# Patient Record
Sex: FEMALE | Race: WHITE | NOT HISPANIC OR LATINO | Employment: OTHER | URBAN - METROPOLITAN AREA
[De-identification: names, ages, dates, MRNs, and addresses within clinical notes are randomized per-mention and may not be internally consistent; named-entity substitution may affect disease eponyms.]

---

## 2017-06-12 ENCOUNTER — OFFICE VISIT (OUTPATIENT)
Dept: INTERNAL MEDICINE | Facility: CLINIC | Age: 47
End: 2017-06-12
Payer: MEDICAID

## 2017-06-12 VITALS
HEART RATE: 106 BPM | HEIGHT: 63 IN | DIASTOLIC BLOOD PRESSURE: 80 MMHG | WEIGHT: 158.31 LBS | RESPIRATION RATE: 18 BRPM | SYSTOLIC BLOOD PRESSURE: 126 MMHG | BODY MASS INDEX: 28.05 KG/M2

## 2017-06-12 DIAGNOSIS — G89.29 CHRONIC BILATERAL LOW BACK PAIN WITH BILATERAL SCIATICA: Primary | ICD-10-CM

## 2017-06-12 DIAGNOSIS — J06.9 UPPER RESPIRATORY TRACT INFECTION, UNSPECIFIED TYPE: ICD-10-CM

## 2017-06-12 DIAGNOSIS — Z72.0 TOBACCO USE: ICD-10-CM

## 2017-06-12 DIAGNOSIS — Z12.39 BREAST CANCER SCREENING: ICD-10-CM

## 2017-06-12 DIAGNOSIS — M54.42 CHRONIC BILATERAL LOW BACK PAIN WITH BILATERAL SCIATICA: Primary | ICD-10-CM

## 2017-06-12 DIAGNOSIS — Z01.419 WELL WOMAN EXAM: ICD-10-CM

## 2017-06-12 DIAGNOSIS — M54.41 CHRONIC BILATERAL LOW BACK PAIN WITH BILATERAL SCIATICA: Primary | ICD-10-CM

## 2017-06-12 DIAGNOSIS — Z76.89 ENCOUNTER TO ESTABLISH CARE: ICD-10-CM

## 2017-06-12 PROBLEM — M54.50 CHRONIC BILATERAL LOW BACK PAIN WITHOUT SCIATICA: Status: ACTIVE | Noted: 2017-06-12

## 2017-06-12 PROCEDURE — 99999 PR PBB SHADOW E&M-EST. PATIENT-LVL V: CPT | Mod: PBBFAC,,, | Performed by: INTERNAL MEDICINE

## 2017-06-12 PROCEDURE — 99215 OFFICE O/P EST HI 40 MIN: CPT | Mod: PBBFAC | Performed by: INTERNAL MEDICINE

## 2017-06-12 PROCEDURE — 99203 OFFICE O/P NEW LOW 30 MIN: CPT | Mod: S$PBB,,, | Performed by: INTERNAL MEDICINE

## 2017-06-12 PROCEDURE — 96372 THER/PROPH/DIAG INJ SC/IM: CPT | Mod: PBBFAC

## 2017-06-12 RX ORDER — METHYLPREDNISOLONE ACETATE 80 MG/ML
80 INJECTION, SUSPENSION INTRA-ARTICULAR; INTRALESIONAL; INTRAMUSCULAR; SOFT TISSUE
Status: COMPLETED | OUTPATIENT
Start: 2017-06-12 | End: 2017-06-12

## 2017-06-12 RX ORDER — BACLOFEN 10 MG/1
10 TABLET ORAL 3 TIMES DAILY
Qty: 90 TABLET | Refills: 11 | Status: SHIPPED | OUTPATIENT
Start: 2017-06-12 | End: 2017-07-17 | Stop reason: DRUGHIGH

## 2017-06-12 RX ORDER — DICLOFENAC SODIUM 50 MG/1
50 TABLET, DELAYED RELEASE ORAL 2 TIMES DAILY
Qty: 60 TABLET | Refills: 0 | Status: SHIPPED | OUTPATIENT
Start: 2017-06-12 | End: 2017-07-06 | Stop reason: SDUPTHER

## 2017-06-12 RX ORDER — IBUPROFEN 200 MG
800 TABLET ORAL EVERY 6 HOURS PRN
COMMUNITY
End: 2017-06-20

## 2017-06-12 RX ORDER — HYDROCODONE BITARTRATE AND ACETAMINOPHEN 5; 325 MG/1; MG/1
1 TABLET ORAL EVERY 12 HOURS PRN
Qty: 60 TABLET | Refills: 0 | Status: SHIPPED | OUTPATIENT
Start: 2017-06-12 | End: 2017-07-17

## 2017-06-12 RX ADMIN — METHYLPREDNISOLONE ACETATE 80 MG: 80 INJECTION, SUSPENSION INTRA-ARTICULAR; INTRALESIONAL; INTRAMUSCULAR; SOFT TISSUE at 03:06

## 2017-06-12 NOTE — PATIENT INSTRUCTIONS
Back Care Tips    Caring for your back  These are things you can do to prevent a recurrence of acute back pain and to reduce symptoms from chronic back pain:  · Maintain a healthy weight. If you are overweight, losing weight will help most types of back pain.  · Exercise is an important part of recovery from most types of back pain. The muscles behind and in front of the spine support the back. This means strengthening both the back muscles and the abdominal muscles will provide better support for your spine.   · Swimming and brisk walking are good overall exercises to improve your fitness level.  · Practice safe lifting methods (below).  · Practice good posture when sitting, standing and walking. Avoid prolonged sitting. This puts more stress on the lower back than standing or walking.  · Wear quality shoes with sufficient arch support. Foot and ankle alignment can affect back symptoms. Women should avoid wearing high heels.  · Therapeutic massage can help relax the back muscles without stretching them.  · During the first 24 to 72 hours after an acute injury or flare-up of chronic back pain, apply an ice pack to the painful area for 20 minutes and then remove it for 20 minutes, over a period of 60 to 90 minutes, or several times a day. As a safety precaution, do not use a heating pad at bedtime. Sleeping on a heating pad can lead to skin burns or tissue damage.  · You can alternate ice and heat therapies.  Medications  Talk to your healthcare provider before using medicines, especially if you have other medical problems or are taking other medicines.  · You may use acetaminophen or ibuprofen to control pain, unless your healthcare provider prescribed other pain medicine. If you have chronic conditions like diabetes, liver or kidney disease, stomach ulcers, or gastrointestinal bleeding, or are taking blood thinners, talk with your healthcare provider before taking any medicines.  · Be careful if you are given  prescription pain medicines, narcotics, or medicine for muscle spasm. They can cause drowsiness, affect your coordination, reflexes, and judgment. Do not drive or operate heavy machinery while taking these types of medicines. Take prescription pain medicine only as prescribed by your healthcare provider.  Lumbar stretch  Here is a simple stretching exercise that will help relax muscle spasm and keep your back more limber. If exercise makes your back pain worse, dont do it.  · Lie on your back with your knees bent and both feet on the ground.  · Slowly raise your left knee to your chest as you flatten your lower back against the floor. Hold for 5 seconds.  · Relax and repeat the exercise with your right knee.  · Do 10 of these exercises for each leg.  Safe lifting method  · Dont bend over at the waist to lift an object off the floor.  Instead, bend your knees and hips in a squat.   · Keep your back and head upright  · Hold the object close to your body, directly in front of you.  · Straighten your legs to lift the object.   · Lower the object to the floor in the reverse fashion.  · If you must slide something across the floor, push it.  Posture tips  Sitting  Sit in chairs with straight backs or low-back support. Keep your knees lower than your hips, with your feet flat on the floor.  When driving, sit up straight. Adjust the seat forward so you are not leaning toward the steering wheel.  A small pillow or rolled towel behind your lower back may help if you are driving long distances.   Standing  When standing for long periods, shift most of your weight to one leg at a time. Alternate legs every few minutes.   Sleeping  The best way to sleep is on your side with your knees bent. Put a low pillow under your head to support your neck in a neutral spine position. Avoid thick pillows that bend your neck to one side. Put a pillow between your legs to further relax your lower back. If you sleep on your back, put pillows  under your knees to support your legs in a slightly flexed position. Use a firm mattress. If your mattress sags, replace it, or use a 1/2-inch plywood board under the mattress to add support.  Follow-up care  Follow up with your healthcare provider, or as advised.  If X-rays, a CT scan or an MRI scan were taken, they will be reviewed by a radiologist. You will be notified of any new findings that may affect your care.  Call 911  Seek emergency medical care if any of the following occur:  · Trouble breathing  · Confusion  · Very drowsy  · Fainting or loss of consciousness  · Rapid or very slow heart rate  · Loss of  bowel or bladder control  When to seek medical care  Call your healthcare provider if any of the following occur:  · Pain becomes worse or spreads to your arms or legs  · Weakness or numbness in one or both arms or legs  · Numbness in the groin area  Date Last Reviewed: 6/1/2016  © 8745-6745 [employer]. [address] [place] [address], [place] [address]. All rights reserved. This information is not intended as a substitute for professional medical care. Always follow your healthcare professional's instructions.

## 2017-06-12 NOTE — PROGRESS NOTES
"Subjective:       Patient ID: Sammi Pope is a 47 y.o. female.    Chief Complaint: Establish Care      HPI:  Patient is new to me and presents to establish care. She reports chronic low back pain. She reports history of bulging disc and "curving spine". She reports chronic pain across the low back. She reports back spasms, worse at night. "I quit breathing due to the pain."  She reports pain for last 15 years. She was living in [place] and following with Dr. Morales Hill and pain specialist was Dr. Taras Lira. She was getting spinal injections and was being prescribed diclofenac and hydrocodone (5-325). She is outo f her medications for about 6 months. She is taking 16 OTC Ibuprofen's daily.  Last injection was > 1 year ago. She does not have her MRI but last was done 3 years ago at [facility] in [place].     She denies any other medical issues. She has not had routine blood work, PCP or MMG since 2013.     Tobacco use: smoking 1 PPD for about 6 years after suicide death of her   EtOH: 3-4 x a year  Illicit drugs: denies    She also reports upper airway congestion and sneezing. Sx started 2-3 days ago. No fevers. Mild cough that is sometimes productive. No SOB or wheezing.     Past Medical History:   Diagnosis Date    Bulging lumbar disc     Migraines        Family History   Problem Relation Age of Onset    Hypertension Mother     Cancer Father 60     lung    Diabetes Sister     Cancer Maternal Aunt      breast       Social History     Social History    Marital status:      Spouse name: N/A    Number of children: N/A    Years of education: N/A     Occupational History    Not on file.     Social History Main Topics    Smoking status: Current Every Day Smoker     Packs/day: 1.00     Types: Cigarettes    Smokeless tobacco: Not on file    Alcohol use Yes      Comment: occ.    Drug use: No    Sexual activity: Yes     Partners: Male     Birth control/ " protection: See Surgical Hx     Other Topics Concern    Not on file     Social History Narrative    No narrative on file       Review of Systems   Constitutional: Negative for activity change, fatigue, fever and unexpected weight change.   HENT: Negative for congestion, ear pain, hearing loss, rhinorrhea and sore throat.    Eyes: Negative for pain, redness and visual disturbance.   Respiratory: Negative for cough, shortness of breath and wheezing.    Cardiovascular: Negative for chest pain, palpitations and leg swelling.   Gastrointestinal: Negative for abdominal pain, constipation, diarrhea, nausea and vomiting.   Genitourinary: Negative for dysuria, frequency and urgency.   Musculoskeletal: Positive for back pain. Negative for joint swelling and neck pain.   Skin: Negative for color change, rash and wound.   Neurological: Negative for dizziness, tremors, weakness, light-headedness and headaches.         Objective:      Physical Exam   Constitutional: She is oriented to person, place, and time. She appears well-developed and well-nourished. No distress.   HENT:   Head: Normocephalic and atraumatic.   Right Ear: External ear normal.   Left Ear: External ear normal.   Mouth/Throat: Oropharynx is clear and moist.   Dull TM b/l  Normal posterior orpharynx   Eyes: Conjunctivae and EOM are normal. Pupils are equal, round, and reactive to light.   Neck: Neck supple. No tracheal deviation present.   Cardiovascular: Normal rate and regular rhythm.  Exam reveals no gallop and no friction rub.    No murmur heard.  Pulmonary/Chest: Effort normal. No respiratory distress. She has wheezes (b/l bases). She has no rales.   Abdominal: Soft. Bowel sounds are normal. She exhibits no distension. There is no tenderness.   Musculoskeletal: She exhibits tenderness (b/l lumbar paraspinal). She exhibits no deformity.   Neurological: She is alert and oriented to person, place, and time. No cranial nerve deficit.   Skin: Skin is warm and  dry.   Psychiatric: She has a normal mood and affect. Her behavior is normal.   Vitals reviewed.      Assessment:       1. Chronic bilateral low back pain with bilateral sciatica    2. Tobacco use    3. Encounter to establish care    4. Breast cancer screening    5. Well woman exam    6. Upper respiratory tract infection, unspecified type        Plan:       Sammi was seen today for establish care.    Diagnoses and all orders for this visit:    Chronic bilateral low back pain with bilateral sciatica  -     diclofenac (VOLTAREN) 50 MG EC tablet; Take 1 tablet (50 mg total) by mouth 2 (two) times daily.  -     MRI Lumbar Spine Without Contrast; Future  -     baclofen (LIORESAL) 10 MG tablet; Take 1 tablet (10 mg total) by mouth 3 (three) times daily.  -     hydrocodone-acetaminophen 5-325mg (NORCO) 5-325 mg per tablet; Take 1 tablet by mouth every 12 (twelve) hours as needed for Pain.  -     methylPREDNISolone acetate injection 80 mg; Inject 1 mL (80 mg total) into the muscle one time.  Will get prior records to review  Start diclofenac and baclofen  1 month supply Norco given. Discussed pain contract: agrees to random UDS, no other providers can prescribe, only 1 pharmacy can fill. Also discussed I reserve the right to stop filling at any time. She agrees and voiced understanding  Needs MRI so she can establish with a pain management physician    Tobacco use  Not ready to quit  Discussed medictions  Smoking due to pain right now    Encounter to establish care  -     CBC auto differential; Future  -     Comprehensive metabolic panel; Future  -     TSH; Future  -     Lipid panel; Future  -     T4, free; Future    Breast cancer screening  -     Mammo Digital Diagnostic Bilat with Tomosynthesis with CAD; Future    Well woman exam  -     Ambulatory Referral to Obstetrics / Gynecology    Upper respiratory tract infection, unspecified type  -     methylPREDNISolone acetate injection 80 mg; Inject 1 mL (80 mg total) into the  muscle one time.  Start claritin OTC    Health Maintenance:  -CRC: not due  -PAP/MMG: MMG ordered  -Bone Density: not due  -tobacco: current smoker  -Vaccines  Flu- out of season    RTC 3 months and PRN. Will call with labs

## 2017-06-20 ENCOUNTER — OFFICE VISIT (OUTPATIENT)
Dept: OBSTETRICS AND GYNECOLOGY | Facility: CLINIC | Age: 47
End: 2017-06-20
Payer: MEDICAID

## 2017-06-20 ENCOUNTER — HOSPITAL ENCOUNTER (OUTPATIENT)
Dept: RADIOLOGY | Facility: HOSPITAL | Age: 47
Discharge: HOME | End: 2017-06-20
Attending: INTERNAL MEDICINE
Payer: MEDICAID

## 2017-06-20 VITALS
WEIGHT: 160.19 LBS | HEART RATE: 82 BPM | BODY MASS INDEX: 28.38 KG/M2 | HEIGHT: 63 IN | SYSTOLIC BLOOD PRESSURE: 126 MMHG | DIASTOLIC BLOOD PRESSURE: 74 MMHG | RESPIRATION RATE: 13 BRPM

## 2017-06-20 VITALS — BODY MASS INDEX: 30.12 KG/M2 | WEIGHT: 170 LBS | HEIGHT: 63 IN

## 2017-06-20 DIAGNOSIS — M54.42 CHRONIC BILATERAL LOW BACK PAIN WITH BILATERAL SCIATICA: ICD-10-CM

## 2017-06-20 DIAGNOSIS — D50.9 MICROCYTIC ANEMIA: Primary | ICD-10-CM

## 2017-06-20 DIAGNOSIS — Z01.419 WELL WOMAN EXAM WITH ROUTINE GYNECOLOGICAL EXAM: Primary | ICD-10-CM

## 2017-06-20 DIAGNOSIS — G89.29 CHRONIC BILATERAL LOW BACK PAIN WITH BILATERAL SCIATICA: ICD-10-CM

## 2017-06-20 DIAGNOSIS — Z12.39 BREAST CANCER SCREENING: ICD-10-CM

## 2017-06-20 DIAGNOSIS — Z90.710 HISTORY OF HYSTERECTOMY: ICD-10-CM

## 2017-06-20 DIAGNOSIS — M54.41 CHRONIC BILATERAL LOW BACK PAIN WITH BILATERAL SCIATICA: ICD-10-CM

## 2017-06-20 LAB
FERRITIN SERPL-MCNC: 140 NG/ML
IRON SERPL-MCNC: 46 UG/DL
RETICS/RBC NFR AUTO: 1.4 %
SATURATED IRON: 13 %
TOTAL IRON BINDING CAPACITY: 346 UG/DL
TRANSFERRIN SERPL-MCNC: 234 MG/DL

## 2017-06-20 PROCEDURE — 99999 PR PBB SHADOW E&M-EST. PATIENT-LVL III: CPT | Mod: PBBFAC,,, | Performed by: OBSTETRICS & GYNECOLOGY

## 2017-06-20 PROCEDURE — 99213 OFFICE O/P EST LOW 20 MIN: CPT | Mod: PBBFAC,25 | Performed by: OBSTETRICS & GYNECOLOGY

## 2017-06-20 PROCEDURE — 77067 SCR MAMMO BI INCL CAD: CPT | Mod: 26,,, | Performed by: RADIOLOGY

## 2017-06-20 PROCEDURE — 72148 MRI LUMBAR SPINE W/O DYE: CPT | Mod: 26,,, | Performed by: RADIOLOGY

## 2017-06-20 PROCEDURE — 99386 PREV VISIT NEW AGE 40-64: CPT | Mod: S$PBB,,, | Performed by: OBSTETRICS & GYNECOLOGY

## 2017-06-20 PROCEDURE — 77063 BREAST TOMOSYNTHESIS BI: CPT | Mod: 26,,, | Performed by: RADIOLOGY

## 2017-06-20 NOTE — PROGRESS NOTES
Subjective:    Patient ID: Sammi Pope is a 47 y.o. y.o. female.     Chief Complaint: Annual Well Woman Exam     History of Present Illness:  Sammi presents today for Annual Well Woman exam. She describes her menses as absent s/p hysterectomy.She denies pelvic pain.  She denies breast tenderness, masses, nipple discharge. She denies difficulty with urination, but does report constipation with bowel movements. She denies menopausal symptoms such as hotflashes, vaginal dryness, and night sweats. She denies bloating, early satiety, or weight changes. She is sexually active. Contraception is by hysterectomy.      Menstrual History:   No LMP recorded. Patient has had a hysterectomy..     OB History      Para Term  AB Living    4 3 3  1     SAB TAB Ectopic Multiple Live Births    1              The following portions of the patient's history were reviewed and updated as appropriate: allergies, current medications, past family history, past medical history, past social history, past surgical history and problem list.    ROS:   CONSTITUTIONAL: weakness, weight gain, Denies: fever, chills, diaphoresis, fatigue, weight loss  ENT: nasal congestion, Denies: sore throat, nasal discharge, epistaxis, tinnitus, hearing loss  EYES: negative for blurry vision, decreased vision, loss of vision, eye pain, diplopia, photophobia, discharge  SKIN: Negative for rash, itching, hives  RESPIRATORY: cough, wheezing, Denies: hemoptysis, shortness of breath, pleuritic chest pain  CARDIOVASCULAR: negative for chest pain, dyspnea on exertion, orthopnea, paroxysmal nocturnal dyspnea, edema, palpitations  BREAST: negative for breast  tenderness, breast mass, nipple discharge, or skin changes  GASTROINTESTINAL: abdominal pain, constipation, Denies: flank pain, nausea, vomiting, diarrhea, black stool, blood in stool  GENITOURINARY: negative for abnormal vaginal bleeding, amenorrhea, decreased libido, dysuria, genital sores,  hematuria, incontinence, menorrhagia, pelvic pain, urinary frequency, vaginal discharge  HEMATOLOGIC/LYMPHATIC: negative for swollen lymph nodes, bleeding, bruising  MUSCULOSKELETAL: back pain, Denies: joint pain, joint stiffness, joint swelling, muscle pain, muscle weakness  NEUROLOGICAL: headache, Denies: dizzy/vertigo, focal weakness, numbness/tingling, speech problems, loss of consciousness, confusion, memory loss  BEHAVORIAL/PSYCH: negative for anxiety, depression, psychosis  ENDOCRINE: negative for polydipsia/polyuria, palpitations, skin changes, temperature intolerance, unexpected weight changes  ALLERGIC/IMMUNOLOGIC: negative for urticaria, hay fever, angioedema      Objective:    Vital Signs:  Vitals:    06/20/17 1224   BP: 126/74   Pulse: 82   Resp: 13       Physical Exam:  General:  alert, cooperative, appears stated age, no distress   Skin:  Skin color, texture, turgor normal. No rashes or lesions   HEENT:  extra ocular movement intact, sclera clear, anicteric   Neck: supple, trachea midline, no adenopathy or thyromegally   Respiratory:  Normal effort   Breasts:  no discharge, erythema, or tenderness, no skin dimpling or peau d'orange   Abdomen:  soft, nontender, no palpable masses   Pelvis: External genitalia: normal general appearance  Urinary system: urethral meatus normal, bladder nontender  Vaginal: normal mucosa without prolapse or lesions  Cervix: removed surgically  Uterus: removed surgically  Adnexa: normal size, nontender bilaterally   Extremities: Normal ROM; no edema, no cyanosis   Neurologial: Normal strength and tone. No focal numbness or weakness   Psychiatric: normal mood, speech, dress, and thought processes         Assessment:       Healthy female exam.     1. Well woman exam with routine gynecological exam    2. History of hysterectomy          Plan:      1. Well woman exam with routine gynecological exam  Healthy exam  Pap no longer indicated per pt history    2. History of  hysterectomy      COUNSELING:  Sammi was counseled on STD pevention, use and side-effects of various contraceptive measures, A.C.O.G. Pap guidelines and recommendations for yearly pelvic exams in addition to recommendations for monthly self breast exams; to see her PCP for other health maintenance.

## 2017-06-22 ENCOUNTER — TELEPHONE (OUTPATIENT)
Dept: INTERNAL MEDICINE | Facility: CLINIC | Age: 47
End: 2017-06-22

## 2017-06-22 DIAGNOSIS — M54.50 CHRONIC BILATERAL LOW BACK PAIN WITHOUT SCIATICA: Primary | ICD-10-CM

## 2017-06-22 DIAGNOSIS — G89.29 CHRONIC BILATERAL LOW BACK PAIN WITHOUT SCIATICA: Primary | ICD-10-CM

## 2017-06-23 DIAGNOSIS — R92.8 ABNORMAL MAMMOGRAM OF BOTH BREASTS: Primary | ICD-10-CM

## 2017-06-26 ENCOUNTER — TELEPHONE (OUTPATIENT)
Dept: RADIOLOGY | Facility: HOSPITAL | Age: 47
End: 2017-06-26

## 2017-06-26 NOTE — PROGRESS NOTES
Informed pt of results. Expressed understanding. Scheduled by [facility] at [facility].

## 2017-07-06 ENCOUNTER — HOSPITAL ENCOUNTER (OUTPATIENT)
Dept: RADIOLOGY | Facility: HOSPITAL | Age: 47
Discharge: HOME | End: 2017-07-06
Attending: INTERNAL MEDICINE
Payer: MEDICAID

## 2017-07-06 VITALS — WEIGHT: 160 LBS | BODY MASS INDEX: 28.35 KG/M2 | HEIGHT: 63 IN

## 2017-07-06 DIAGNOSIS — R92.8 ABNORMAL MAMMOGRAM OF BOTH BREASTS: ICD-10-CM

## 2017-07-06 DIAGNOSIS — G89.29 CHRONIC BILATERAL LOW BACK PAIN WITH BILATERAL SCIATICA: ICD-10-CM

## 2017-07-06 DIAGNOSIS — M54.42 CHRONIC BILATERAL LOW BACK PAIN WITH BILATERAL SCIATICA: ICD-10-CM

## 2017-07-06 DIAGNOSIS — M54.41 CHRONIC BILATERAL LOW BACK PAIN WITH BILATERAL SCIATICA: ICD-10-CM

## 2017-07-06 PROCEDURE — 77066 DX MAMMO INCL CAD BI: CPT | Mod: 26,,, | Performed by: RADIOLOGY

## 2017-07-06 PROCEDURE — 77062 BREAST TOMOSYNTHESIS BI: CPT | Mod: 26,,, | Performed by: RADIOLOGY

## 2017-07-06 PROCEDURE — 76641 ULTRASOUND BREAST COMPLETE: CPT | Mod: 26,50,, | Performed by: RADIOLOGY

## 2017-07-06 PROCEDURE — 76641 ULTRASOUND BREAST COMPLETE: CPT | Mod: TC,50

## 2017-07-06 PROCEDURE — 77066 DX MAMMO INCL CAD BI: CPT | Mod: TC

## 2017-07-06 NOTE — TELEPHONE ENCOUNTER
Requested Prescriptions     Pending Prescriptions Disp Refills    diclofenac (VOLTAREN) 50 MG EC tablet 60 tablet 0     Sig: Take 1 tablet (50 mg total) by mouth 2 (two) times daily.    Susannah

## 2017-07-10 RX ORDER — DICLOFENAC SODIUM 50 MG/1
50 TABLET, DELAYED RELEASE ORAL 2 TIMES DAILY
Qty: 60 TABLET | Refills: 0 | Status: SHIPPED | OUTPATIENT
Start: 2017-07-10 | End: 2017-08-04 | Stop reason: SDUPTHER

## 2017-07-17 ENCOUNTER — OFFICE VISIT (OUTPATIENT)
Dept: INTERNAL MEDICINE | Facility: CLINIC | Age: 47
End: 2017-07-17
Payer: MEDICAID

## 2017-07-17 VITALS
WEIGHT: 157.19 LBS | BODY MASS INDEX: 27.85 KG/M2 | RESPIRATION RATE: 18 BRPM | DIASTOLIC BLOOD PRESSURE: 80 MMHG | HEIGHT: 63 IN | SYSTOLIC BLOOD PRESSURE: 118 MMHG | HEART RATE: 87 BPM

## 2017-07-17 DIAGNOSIS — M54.50 CHRONIC BILATERAL LOW BACK PAIN WITHOUT SCIATICA: Primary | ICD-10-CM

## 2017-07-17 DIAGNOSIS — G89.29 CHRONIC BILATERAL LOW BACK PAIN WITHOUT SCIATICA: Primary | ICD-10-CM

## 2017-07-17 DIAGNOSIS — G47.00 INSOMNIA, UNSPECIFIED TYPE: ICD-10-CM

## 2017-07-17 DIAGNOSIS — Z72.0 TOBACCO USE: ICD-10-CM

## 2017-07-17 PROCEDURE — 99213 OFFICE O/P EST LOW 20 MIN: CPT | Mod: PBBFAC | Performed by: INTERNAL MEDICINE

## 2017-07-17 PROCEDURE — 99213 OFFICE O/P EST LOW 20 MIN: CPT | Mod: S$PBB,,, | Performed by: INTERNAL MEDICINE

## 2017-07-17 PROCEDURE — 99999 PR PBB SHADOW E&M-EST. PATIENT-LVL III: CPT | Mod: PBBFAC,,, | Performed by: INTERNAL MEDICINE

## 2017-07-17 RX ORDER — BACLOFEN 20 MG/1
20 TABLET ORAL 3 TIMES DAILY
Qty: 90 TABLET | Refills: 11 | Status: SHIPPED | OUTPATIENT
Start: 2017-07-17 | End: 2017-09-26 | Stop reason: SDUPTHER

## 2017-07-17 RX ORDER — AMITRIPTYLINE HYDROCHLORIDE 25 MG/1
25 TABLET, FILM COATED ORAL NIGHTLY PRN
Qty: 30 TABLET | Refills: 1 | Status: SHIPPED | OUTPATIENT
Start: 2017-07-17 | End: 2017-09-26 | Stop reason: SDUPTHER

## 2017-07-17 NOTE — PROGRESS NOTES
"Subjective:       Patient ID: Sammi Pope is a 47 y.o. female.    Chief Complaint: Back Pain      HPI:  Patient is known to me and presents for follow up back pain. She was continued on her prior dose Norco 5/325mg BID PRN at last visit. Tells me her insurance co will only pay for 45 tabs. She is still having pain. + muscle spasms that are waking her up at night. She is using the baclofen 10mg witout relief. She is on diclofenac as well for anti-inflammatory. Her  is at the appointment and wants her Norco dose increased. She is crying. She also reports she can't sleep. Can fall asleep but can't stay asleep. Tells me she used to be on Elavil and would like a prescription.         From last visit for historical purposes: She reports chronic low back pain. She reports history of bulging disc and "curving spine". She reports chronic pain across the low back. She reports back spasms, worse at night. "I quit breathing due to the pain."  She reports pain for last 15 years. She was living in [place] and following with Dr. Morales Hill and pain specialist was Dr. Taras Lira. She was getting spinal injections and was being prescribed diclofenac and hydrocodone (5-325). She is outo f her medications for about 6 months. She is taking 16 OTC Ibuprofen's daily.  Last injection was > 1 year ago. She does not have her MRI but last was done 3 years ago at [facility] in [place].        Tobacco use: smoking 1 PPD for about 6 years after suicide death of her   EtOH: 3-4 x a year  Illicit drugs: denies     Sh  Past Medical History:   Diagnosis Date    Bulging lumbar disc     Migraines        Family History   Problem Relation Age of Onset    Hypertension Mother     Cancer Father 60     lung    Diabetes Sister     Cancer Maternal Aunt      breast    Breast cancer Maternal Aunt     Colon cancer Neg Hx     Ovarian cancer Neg Hx        Social History     Social History    " Marital status:      Spouse name: N/A    Number of children: N/A    Years of education: N/A     Occupational History    Not on file.     Social History Main Topics    Smoking status: Current Every Day Smoker     Packs/day: 1.00     Years: 6.00     Types: Cigarettes    Smokeless tobacco: Never Used    Alcohol use Yes      Comment: occ.    Drug use: No    Sexual activity: Yes     Partners: Male     Birth control/ protection: See Surgical Hx      Comment:      Other Topics Concern    Not on file     Social History Narrative    No narrative on file       Review of Systems   Constitutional: Negative for activity change, fatigue, fever and unexpected weight change.   HENT: Negative for congestion, ear pain, hearing loss, rhinorrhea and sore throat.    Eyes: Negative for pain, redness and visual disturbance.   Respiratory: Negative for cough, shortness of breath and wheezing.    Cardiovascular: Negative for chest pain, palpitations and leg swelling.   Gastrointestinal: Negative for abdominal pain, constipation, diarrhea, nausea and vomiting.   Genitourinary: Negative for dysuria, frequency, pelvic pain and urgency.   Musculoskeletal: Positive for back pain and myalgias (muscle spasms). Negative for joint swelling and neck pain.   Skin: Negative for color change, rash and wound.   Neurological: Negative for dizziness, tremors, weakness, light-headedness and headaches.         Objective:      Physical Exam   Constitutional: She is oriented to person, place, and time. She appears well-developed and well-nourished. No distress.   HENT:   Head: Normocephalic and atraumatic.   Right Ear: External ear normal.   Left Ear: External ear normal.   Eyes: Conjunctivae and EOM are normal. Pupils are equal, round, and reactive to light.   Neck: Neck supple. No tracheal deviation present.   Cardiovascular: Normal rate and regular rhythm.  Exam reveals no gallop and no friction rub.    No murmur  heard.  Pulmonary/Chest: Effort normal and breath sounds normal. No respiratory distress. She has no wheezes. She has no rales.   Abdominal: Soft. Bowel sounds are normal. She exhibits no distension. There is no tenderness.   Musculoskeletal: She exhibits tenderness (b/l low back paraspinal muscles). She exhibits no edema or deformity.   Neurological: She is alert and oriented to person, place, and time. No cranial nerve deficit.   Skin: Skin is warm and dry.   Psychiatric: She has a normal mood and affect. Her behavior is normal.   Vitals reviewed.      Assessment:       1. Chronic bilateral low back pain without sciatica    2. Tobacco use    3. Insomnia, unspecified type        Plan:       Sammi was seen today for back pain.    Diagnoses and all orders for this visit:    Chronic bilateral low back pain without sciatica  -     Ambulatory consult to Pain Clinic  -     baclofen (LIORESAL) 20 MG tablet; Take 1 tablet (20 mg total) by mouth 3 (three) times daily.  Increase baclofen to 20mg TID  Will continue on Norco 5mg BID for now. If her insurance iwll only cover 45 tabs that is what she will get per month. Use as needed.  Cont diclofenac  Can consider gabapentin, cymbalta, etc for chronic pain management  Referred to pain management  Discussed her her and her  today: I am not a pain management physician and I can not assume care for her chronic narcotic use. I will continue on the same dose but can not increase the dose    Tobacco use  Needs to quit. Not ready    Insomnia, unspecified type  -     amitriptyline (ELAVIL) 25 MG tablet; Take 1 tablet (25 mg total) by mouth nightly as needed for Insomnia.  New problem  Likely pain playing a role here but will trial elavil at night as well       Health Maintenance:  -CRC: not due  -PAP/MMG: MMG ordered  -Bone Density: not due  -tobacco: current smoker  -Vaccines  Flu- out of season    RTC 6 months and PRN.

## 2017-07-17 NOTE — PATIENT INSTRUCTIONS
Back Care Tips    Caring for your back  These are things you can do to prevent a recurrence of acute back pain and to reduce symptoms from chronic back pain:  · Maintain a healthy weight. If you are overweight, losing weight will help most types of back pain.  · Exercise is an important part of recovery from most types of back pain. The muscles behind and in front of the spine support the back. This means strengthening both the back muscles and the abdominal muscles will provide better support for your spine.   · Swimming and brisk walking are good overall exercises to improve your fitness level.  · Practice safe lifting methods (below).  · Practice good posture when sitting, standing and walking. Avoid prolonged sitting. This puts more stress on the lower back than standing or walking.  · Wear quality shoes with sufficient arch support. Foot and ankle alignment can affect back symptoms. Women should avoid wearing high heels.  · Therapeutic massage can help relax the back muscles without stretching them.  · During the first 24 to 72 hours after an acute injury or flare-up of chronic back pain, apply an ice pack to the painful area for 20 minutes and then remove it for 20 minutes, over a period of 60 to 90 minutes, or several times a day. As a safety precaution, do not use a heating pad at bedtime. Sleeping on a heating pad can lead to skin burns or tissue damage.  · You can alternate ice and heat therapies.  Medications  Talk to your healthcare provider before using medicines, especially if you have other medical problems or are taking other medicines.  · You may use acetaminophen or ibuprofen to control pain, unless your healthcare provider prescribed other pain medicine. If you have chronic conditions like diabetes, liver or kidney disease, stomach ulcers, or gastrointestinal bleeding, or are taking blood thinners, talk with your healthcare provider before taking any medicines.  · Be careful if you are given  prescription pain medicines, narcotics, or medicine for muscle spasm. They can cause drowsiness, affect your coordination, reflexes, and judgment. Do not drive or operate heavy machinery while taking these types of medicines. Take prescription pain medicine only as prescribed by your healthcare provider.  Lumbar stretch  Here is a simple stretching exercise that will help relax muscle spasm and keep your back more limber. If exercise makes your back pain worse, dont do it.  · Lie on your back with your knees bent and both feet on the ground.  · Slowly raise your left knee to your chest as you flatten your lower back against the floor. Hold for 5 seconds.  · Relax and repeat the exercise with your right knee.  · Do 10 of these exercises for each leg.  Safe lifting method  · Dont bend over at the waist to lift an object off the floor.  Instead, bend your knees and hips in a squat.   · Keep your back and head upright  · Hold the object close to your body, directly in front of you.  · Straighten your legs to lift the object.   · Lower the object to the floor in the reverse fashion.  · If you must slide something across the floor, push it.  Posture tips  Sitting  Sit in chairs with straight backs or low-back support. Keep your knees lower than your hips, with your feet flat on the floor.  When driving, sit up straight. Adjust the seat forward so you are not leaning toward the steering wheel.  A small pillow or rolled towel behind your lower back may help if you are driving long distances.   Standing  When standing for long periods, shift most of your weight to one leg at a time. Alternate legs every few minutes.   Sleeping  The best way to sleep is on your side with your knees bent. Put a low pillow under your head to support your neck in a neutral spine position. Avoid thick pillows that bend your neck to one side. Put a pillow between your legs to further relax your lower back. If you sleep on your back, put pillows  under your knees to support your legs in a slightly flexed position. Use a firm mattress. If your mattress sags, replace it, or use a 1/2-inch plywood board under the mattress to add support.  Follow-up care  Follow up with your healthcare provider, or as advised.  If X-rays, a CT scan or an MRI scan were taken, they will be reviewed by a radiologist. You will be notified of any new findings that may affect your care.  Call 911  Seek emergency medical care if any of the following occur:  · Trouble breathing  · Confusion  · Very drowsy  · Fainting or loss of consciousness  · Rapid or very slow heart rate  · Loss of  bowel or bladder control  When to seek medical care  Call your healthcare provider if any of the following occur:  · Pain becomes worse or spreads to your arms or legs  · Weakness or numbness in one or both arms or legs  · Numbness in the groin area  Date Last Reviewed: 6/1/2016  © 3084-1066 [employer] [address] [place] [address], [place] [address]. All rights reserved. This information is not intended as a substitute for professional medical care. Always follow your healthcare professional's instructions.

## 2017-07-28 ENCOUNTER — TELEPHONE (OUTPATIENT)
Dept: INTERNAL MEDICINE | Facility: CLINIC | Age: 47
End: 2017-07-28

## 2017-07-28 RX ORDER — HYDROCODONE BITARTRATE AND ACETAMINOPHEN 5; 325 MG/1; MG/1
1 TABLET ORAL EVERY 12 HOURS PRN
Qty: 60 TABLET | Refills: 0 | Status: SHIPPED | OUTPATIENT
Start: 2017-07-28 | End: 2017-08-28 | Stop reason: SDUPTHER

## 2017-07-28 NOTE — TELEPHONE ENCOUNTER
I notified patient that you have reviewed her records and we discussed your recommendations. She is requesting a refill on the Norco. Please advise. Thanks.

## 2017-07-28 NOTE — TELEPHONE ENCOUNTER
Pt calling to check status of medical record request. Pt wants to know if records were received to Dr. Hill's office. Please advise.

## 2017-07-28 NOTE — TELEPHONE ENCOUNTER
I did receive them. I see that she was prescribed Norco 5mg BID and that is what I have continued her on. I also see that she did not tolerate gabapentin in the past. I don't see if she has ever been on lyrica so we could consider this medication in the future. She has done physical therapy with her prior MD and was also getting injections. My plan for her remains the same. Can cont Norco 5mg BID PRN. Can consider lyrical or cymbalta int he future for pain management. Cont baclofen. Needs to try and find pain management MD for continued injections.    Thanks

## 2017-08-04 DIAGNOSIS — M54.41 CHRONIC BILATERAL LOW BACK PAIN WITH BILATERAL SCIATICA: ICD-10-CM

## 2017-08-04 DIAGNOSIS — G89.29 CHRONIC BILATERAL LOW BACK PAIN WITH BILATERAL SCIATICA: ICD-10-CM

## 2017-08-04 DIAGNOSIS — M54.42 CHRONIC BILATERAL LOW BACK PAIN WITH BILATERAL SCIATICA: ICD-10-CM

## 2017-08-04 RX ORDER — DICLOFENAC SODIUM 50 MG/1
TABLET, DELAYED RELEASE ORAL
Qty: 60 TABLET | Refills: 0 | Status: SHIPPED | OUTPATIENT
Start: 2017-08-04 | End: 2017-09-26 | Stop reason: SDUPTHER

## 2017-08-11 ENCOUNTER — TELEPHONE (OUTPATIENT)
Dept: INTERNAL MEDICINE | Facility: CLINIC | Age: 47
End: 2017-08-11

## 2017-08-11 NOTE — TELEPHONE ENCOUNTER
She should call her insurance company and see what pain doctors are covered under her plan. She can then call those doctors for appointments. Thanks

## 2017-08-11 NOTE — TELEPHONE ENCOUNTER
----- Message from Marian Omer sent at 2017 12:19 PM CDT -----  Contact: Pt  Sammi Pope  MRN: [id number]  : 1970  PCP: Imelda Whitt  Home Phone      [phone]  Work Phone      Not on file.  Mobile          [phone]      MESSAGE: Sammi called to see if we had a list of pain specialists in the area that take Medicaid. No one she tried calling takes it. Please advise.    Phone [phone]

## 2017-08-11 NOTE — TELEPHONE ENCOUNTER
Patient stated [facility] in [place] isn't accepting any new Medicaid patients that lives outside of the [place] area at the moment.

## 2017-08-15 ENCOUNTER — TELEPHONE (OUTPATIENT)
Dept: INTERNAL MEDICINE | Facility: CLINIC | Age: 47
End: 2017-08-15

## 2017-08-15 NOTE — TELEPHONE ENCOUNTER
I contacted the patient to obtain more info on this provider. She states that she actually will not be able to see him as he is leaving and new doctor not coming in until October. She has been unable to find a pain management doctor who accepts medicaid. She is asking if you will continue to prescribe her pain medication until she can get an appt with this doctor in October. Please advise. Thanks.

## 2017-08-15 NOTE — TELEPHONE ENCOUNTER
----- Message from Demi Arana sent at 8/15/2017 10:36 AM CDT -----  Contact: SELF  Sammi Pope  MRN: [id number]  : 1970  PCP: Imelda Whitt  Home Phone      [phone]  Work Phone      Not on file.  Mobile          [phone]      MESSAGE: NEED AREFERRAL SENT TO  DR BRITT SALCIDO  FAX  [phone]      [phone]

## 2017-08-28 RX ORDER — HYDROCODONE BITARTRATE AND ACETAMINOPHEN 5; 325 MG/1; MG/1
1 TABLET ORAL EVERY 12 HOURS PRN
Qty: 60 TABLET | Refills: 0 | Status: SHIPPED | OUTPATIENT
Start: 2017-08-28 | End: 2017-09-26 | Stop reason: SDUPTHER

## 2017-08-28 NOTE — TELEPHONE ENCOUNTER
----- Message from Demi Arana sent at 2017 10:35 AM CDT -----  Contact: self  Sammi Pope  MRN: [id number]  : 1970  PCP: Imelda Whitt  Home Phone      [phone]  Work Phone      Not on file.  Mobile          [phone]      MESSAGE: makayla colunga-----[phone]

## 2017-09-08 DIAGNOSIS — G89.29 CHRONIC BILATERAL LOW BACK PAIN WITH BILATERAL SCIATICA: ICD-10-CM

## 2017-09-08 DIAGNOSIS — M54.42 CHRONIC BILATERAL LOW BACK PAIN WITH BILATERAL SCIATICA: ICD-10-CM

## 2017-09-08 DIAGNOSIS — M54.41 CHRONIC BILATERAL LOW BACK PAIN WITH BILATERAL SCIATICA: ICD-10-CM

## 2017-09-08 RX ORDER — DICLOFENAC SODIUM 50 MG/1
TABLET, DELAYED RELEASE ORAL
Qty: 60 TABLET | Refills: 0 | OUTPATIENT
Start: 2017-09-08

## 2017-09-08 NOTE — TELEPHONE ENCOUNTER
Requested Prescriptions     Pending Prescriptions Disp Refills    diclofenac (VOLTAREN) 50 MG EC tablet 60 tablet 0

## 2017-09-26 DIAGNOSIS — M54.42 CHRONIC BILATERAL LOW BACK PAIN WITH BILATERAL SCIATICA: ICD-10-CM

## 2017-09-26 DIAGNOSIS — G89.29 CHRONIC BILATERAL LOW BACK PAIN WITHOUT SCIATICA: ICD-10-CM

## 2017-09-26 DIAGNOSIS — M54.50 CHRONIC BILATERAL LOW BACK PAIN WITHOUT SCIATICA: ICD-10-CM

## 2017-09-26 DIAGNOSIS — G89.29 CHRONIC BILATERAL LOW BACK PAIN WITH BILATERAL SCIATICA: ICD-10-CM

## 2017-09-26 DIAGNOSIS — G47.00 INSOMNIA, UNSPECIFIED TYPE: ICD-10-CM

## 2017-09-26 DIAGNOSIS — M54.41 CHRONIC BILATERAL LOW BACK PAIN WITH BILATERAL SCIATICA: ICD-10-CM

## 2017-09-26 RX ORDER — DICLOFENAC SODIUM 50 MG/1
TABLET, DELAYED RELEASE ORAL
Qty: 60 TABLET | Refills: 0 | Status: SHIPPED | OUTPATIENT
Start: 2017-09-26 | End: 2017-10-27 | Stop reason: SDUPTHER

## 2017-09-26 RX ORDER — BACLOFEN 20 MG/1
20 TABLET ORAL 3 TIMES DAILY
Qty: 90 TABLET | Refills: 11 | Status: SHIPPED | OUTPATIENT
Start: 2017-09-26 | End: 2018-08-08 | Stop reason: SDUPTHER

## 2017-09-26 RX ORDER — HYDROCODONE BITARTRATE AND ACETAMINOPHEN 5; 325 MG/1; MG/1
1 TABLET ORAL EVERY 12 HOURS PRN
Qty: 60 TABLET | Refills: 0 | Status: SHIPPED | OUTPATIENT
Start: 2017-09-26 | End: 2017-11-02 | Stop reason: SDUPTHER

## 2017-09-26 RX ORDER — AMITRIPTYLINE HYDROCHLORIDE 25 MG/1
25 TABLET, FILM COATED ORAL NIGHTLY PRN
Qty: 30 TABLET | Refills: 1 | Status: SHIPPED | OUTPATIENT
Start: 2017-09-26 | End: 2017-11-21 | Stop reason: SDUPTHER

## 2017-09-26 NOTE — TELEPHONE ENCOUNTER
For continuity purposes:  Filling norco until she sees pain management in October per our last conversation.

## 2017-10-19 ENCOUNTER — TELEPHONE (OUTPATIENT)
Dept: INTERNAL MEDICINE | Facility: CLINIC | Age: 47
End: 2017-10-19

## 2017-10-19 DIAGNOSIS — G89.29 CHRONIC BILATERAL LOW BACK PAIN WITHOUT SCIATICA: Primary | ICD-10-CM

## 2017-10-19 DIAGNOSIS — M54.50 CHRONIC BILATERAL LOW BACK PAIN WITHOUT SCIATICA: Primary | ICD-10-CM

## 2017-10-19 NOTE — TELEPHONE ENCOUNTER
----- Message from Marian Omer sent at 10/19/2017  9:37 AM CDT -----  Contact: Pt  Sammi Pope  MRN: [id number]  : 1970  PCP: Imelda Whitt  Home Phone      [phone]  Work Phone      Not on file.  Mobile          [phone]      MESSAGE:  Called to check on her referral to [facility] in [place]. Says she needs the referral and clinic notes faxed to [phone].    PHONE:  [phone]

## 2017-10-27 ENCOUNTER — TELEPHONE (OUTPATIENT)
Dept: INTERNAL MEDICINE | Facility: CLINIC | Age: 47
End: 2017-10-27

## 2017-10-27 DIAGNOSIS — G89.29 CHRONIC BILATERAL LOW BACK PAIN WITH BILATERAL SCIATICA: ICD-10-CM

## 2017-10-27 DIAGNOSIS — M54.41 CHRONIC BILATERAL LOW BACK PAIN WITH BILATERAL SCIATICA: ICD-10-CM

## 2017-10-27 DIAGNOSIS — M54.42 CHRONIC BILATERAL LOW BACK PAIN WITH BILATERAL SCIATICA: ICD-10-CM

## 2017-10-27 RX ORDER — DICLOFENAC SODIUM 50 MG/1
TABLET, DELAYED RELEASE ORAL
Qty: 60 TABLET | Refills: 0 | Status: SHIPPED | OUTPATIENT
Start: 2017-10-27 | End: 2017-11-21 | Stop reason: SDUPTHER

## 2017-11-02 NOTE — TELEPHONE ENCOUNTER
----- Message from Marian Omer sent at 2017  4:14 PM CDT -----  Contact: Pt  Sammi Pope  MRN: [id number]  : 1970  PCP: Imelda Whitt  Home Phone      [phone]  Work Phone      Not on file.  Mobile          [phone]      MESSAGE:  Needs refill on [facility]. Please advise.    PHONE:  [phone]

## 2017-11-03 RX ORDER — HYDROCODONE BITARTRATE AND ACETAMINOPHEN 5; 325 MG/1; MG/1
1 TABLET ORAL EVERY 12 HOURS PRN
Qty: 60 TABLET | Refills: 0 | Status: SHIPPED | OUTPATIENT
Start: 2017-11-03 | End: 2017-12-05 | Stop reason: SDUPTHER

## 2017-11-21 ENCOUNTER — TELEPHONE (OUTPATIENT)
Dept: INTERNAL MEDICINE | Facility: CLINIC | Age: 47
End: 2017-11-21

## 2017-11-21 DIAGNOSIS — M54.42 CHRONIC BILATERAL LOW BACK PAIN WITH BILATERAL SCIATICA: ICD-10-CM

## 2017-11-21 DIAGNOSIS — G89.29 CHRONIC BILATERAL LOW BACK PAIN WITH BILATERAL SCIATICA: ICD-10-CM

## 2017-11-21 DIAGNOSIS — M54.41 CHRONIC BILATERAL LOW BACK PAIN WITH BILATERAL SCIATICA: ICD-10-CM

## 2017-11-21 DIAGNOSIS — G47.00 INSOMNIA, UNSPECIFIED TYPE: ICD-10-CM

## 2017-11-21 RX ORDER — DICLOFENAC SODIUM 50 MG/1
TABLET, DELAYED RELEASE ORAL
Qty: 60 TABLET | Refills: 0 | Status: SHIPPED | OUTPATIENT
Start: 2017-11-21 | End: 2017-12-18 | Stop reason: SDUPTHER

## 2017-11-21 RX ORDER — AMITRIPTYLINE HYDROCHLORIDE 25 MG/1
25 TABLET, FILM COATED ORAL NIGHTLY PRN
Qty: 30 TABLET | Refills: 1 | Status: SHIPPED | OUTPATIENT
Start: 2017-11-21 | End: 2018-01-17 | Stop reason: SDUPTHER

## 2017-11-21 NOTE — TELEPHONE ENCOUNTER
Refill request has been received for refills on amitriptyline and diclofenac  please  Advise  Thanks!  KATHIE dickerson

## 2017-12-05 RX ORDER — HYDROCODONE BITARTRATE AND ACETAMINOPHEN 5; 325 MG/1; MG/1
1 TABLET ORAL EVERY 12 HOURS PRN
Qty: 60 TABLET | Refills: 0 | Status: SHIPPED | OUTPATIENT
Start: 2017-12-05 | End: 2018-01-05 | Stop reason: SDUPTHER

## 2017-12-05 NOTE — TELEPHONE ENCOUNTER
----- Message from Larisa Baker sent at 2017  9:47 AM CST -----  Contact: self  Sammi Pope  MRN: [id number]  : 1970  PCP: Imelda Whitt  Home Phone      [phone]  Work Phone      Not on file.  Mobile          [phone]      MESSAGE:   Patient calling for a refill on her [employer]  Phone [phone]

## 2017-12-18 ENCOUNTER — TELEPHONE (OUTPATIENT)
Dept: INTERNAL MEDICINE | Facility: CLINIC | Age: 47
End: 2017-12-18

## 2017-12-18 DIAGNOSIS — M54.42 CHRONIC BILATERAL LOW BACK PAIN WITH BILATERAL SCIATICA: ICD-10-CM

## 2017-12-18 DIAGNOSIS — M54.41 CHRONIC BILATERAL LOW BACK PAIN WITH BILATERAL SCIATICA: ICD-10-CM

## 2017-12-18 DIAGNOSIS — G89.29 CHRONIC BILATERAL LOW BACK PAIN WITH BILATERAL SCIATICA: ICD-10-CM

## 2017-12-18 RX ORDER — DICLOFENAC SODIUM 50 MG/1
TABLET, DELAYED RELEASE ORAL
Qty: 60 TABLET | Refills: 0 | Status: SHIPPED | OUTPATIENT
Start: 2017-12-18 | End: 2018-02-02 | Stop reason: SDUPTHER

## 2018-01-05 ENCOUNTER — OFFICE VISIT (OUTPATIENT)
Dept: INTERNAL MEDICINE | Facility: CLINIC | Age: 48
End: 2018-01-05
Payer: MEDICAID

## 2018-01-05 VITALS
HEIGHT: 63 IN | HEART RATE: 112 BPM | BODY MASS INDEX: 28.32 KG/M2 | OXYGEN SATURATION: 100 % | RESPIRATION RATE: 16 BRPM | DIASTOLIC BLOOD PRESSURE: 92 MMHG | WEIGHT: 159.81 LBS | SYSTOLIC BLOOD PRESSURE: 136 MMHG

## 2018-01-05 DIAGNOSIS — J06.9 VIRAL URI: ICD-10-CM

## 2018-01-05 DIAGNOSIS — M54.50 CHRONIC BILATERAL LOW BACK PAIN WITHOUT SCIATICA: Primary | ICD-10-CM

## 2018-01-05 DIAGNOSIS — G89.29 CHRONIC BILATERAL LOW BACK PAIN WITHOUT SCIATICA: Primary | ICD-10-CM

## 2018-01-05 PROCEDURE — 99214 OFFICE O/P EST MOD 30 MIN: CPT | Mod: S$PBB,,, | Performed by: INTERNAL MEDICINE

## 2018-01-05 PROCEDURE — 99213 OFFICE O/P EST LOW 20 MIN: CPT | Mod: PBBFAC | Performed by: INTERNAL MEDICINE

## 2018-01-05 PROCEDURE — 99999 PR PBB SHADOW E&M-EST. PATIENT-LVL III: CPT | Mod: PBBFAC,,, | Performed by: INTERNAL MEDICINE

## 2018-01-05 RX ORDER — LORATADINE 10 MG/1
10 TABLET ORAL DAILY
Qty: 30 TABLET | Refills: 0 | Status: SHIPPED | OUTPATIENT
Start: 2018-01-05 | End: 2018-06-11

## 2018-01-05 RX ORDER — HYDROCODONE BITARTRATE AND ACETAMINOPHEN 5; 325 MG/1; MG/1
1 TABLET ORAL EVERY 12 HOURS PRN
Qty: 60 TABLET | Refills: 0 | Status: SHIPPED | OUTPATIENT
Start: 2018-01-05 | End: 2018-02-05 | Stop reason: SDUPTHER

## 2018-01-05 RX ORDER — METHYLPREDNISOLONE ACETATE 40 MG/ML
40 INJECTION, SUSPENSION INTRA-ARTICULAR; INTRALESIONAL; INTRAMUSCULAR; SOFT TISSUE
Status: COMPLETED | OUTPATIENT
Start: 2018-01-05 | End: 2018-01-05

## 2018-01-05 RX ADMIN — METHYLPREDNISOLONE ACETATE 40 MG: 40 INJECTION, SUSPENSION INTRA-ARTICULAR; INTRALESIONAL; INTRAMUSCULAR; SOFT TISSUE at 08:01

## 2018-01-05 NOTE — PROGRESS NOTES
Subjective:       Patient ID: Sammi Pope is a 47 y.o. female.    Chief Complaint: Sinus Problem and Medication Management (Discuss pain meds)      HPI:  Patient is known to me and presents for follow up back pain. She saw phys med at [facility] who started injections and Lyrica (side effects with gabapentin in the past). States she twisted her back and pain is worse. Wants her pain meds increased.    She also reports congestion and cough for last 2-3 days. No fevers. Cough is not productive. No SOB or wheezing. + sneezing and mild sore throat.     Past Medical History:   Diagnosis Date    Bulging lumbar disc     Migraines        Family History   Problem Relation Age of Onset    Hypertension Mother     Cancer Father 60     lung    Diabetes Sister     Cancer Maternal Aunt      breast    Breast cancer Maternal Aunt     Colon cancer Neg Hx     Ovarian cancer Neg Hx        Social History     Social History    Marital status:      Spouse name: N/A    Number of children: N/A    Years of education: N/A     Occupational History    Not on file.     Social History Main Topics    Smoking status: Current Every Day Smoker     Packs/day: 1.00     Years: 6.00     Types: Cigarettes    Smokeless tobacco: Never Used    Alcohol use Yes      Comment: occ.    Drug use: No    Sexual activity: Yes     Partners: Male     Birth control/ protection: See Surgical Hx      Comment:      Other Topics Concern    Not on file     Social History Narrative    No narrative on file       Review of Systems   Constitutional: Negative for activity change, fatigue, fever and unexpected weight change.   HENT: Positive for congestion. Negative for ear pain, hearing loss, rhinorrhea and sore throat.    Eyes: Negative for pain, redness and visual disturbance.   Respiratory: Positive for cough. Negative for shortness of breath and wheezing.    Cardiovascular: Negative for chest pain, palpitations and leg swelling.    Gastrointestinal: Negative for abdominal pain, constipation, diarrhea, nausea and vomiting.   Genitourinary: Negative for dysuria, frequency and urgency.   Musculoskeletal: Negative for back pain, joint swelling and neck pain.   Skin: Negative for color change, rash and wound.   Neurological: Negative for dizziness, tremors, weakness, light-headedness and headaches.         Objective:      Physical Exam   Constitutional: She is oriented to person, place, and time. She appears well-developed and well-nourished. No distress.   HENT:   Head: Normocephalic and atraumatic.   Right Ear: Tympanic membrane and external ear normal.   Left Ear: Tympanic membrane and external ear normal.   Mouth/Throat: Posterior oropharyngeal erythema present. No oropharyngeal exudate or posterior oropharyngeal edema.   Eyes: Conjunctivae and EOM are normal. Pupils are equal, round, and reactive to light. Right eye exhibits no discharge. Left eye exhibits no discharge.   Neck: Neck supple. No tracheal deviation present.   Cardiovascular: Normal rate and regular rhythm.    No murmur heard.  Pulmonary/Chest: Effort normal and breath sounds normal. No respiratory distress. She has no wheezes. She has no rales.   Abdominal: Soft. Bowel sounds are normal. She exhibits no distension. There is no tenderness.   Neurological: She is alert and oriented to person, place, and time. No cranial nerve deficit.   Skin: Skin is warm and dry.   Psychiatric: She has a normal mood and affect. Her behavior is normal.   Vitals reviewed.      Assessment:       1. Chronic bilateral low back pain without sciatica    2. Viral URI        Plan:       Sammi was seen today for sinus problem and medication management.    Diagnoses and all orders for this visit:    Chronic bilateral low back pain without sciatica  -     hydrocodone-acetaminophen 5-325mg (NORCO) 5-325 mg per tablet; Take 1 tablet by mouth every 12 (twelve) hours as needed for Pain.    Discussed with  patient again, I am not increasing her dose of narcotics. Needs to try other methods for pain relief.  I will continue current dose. Compliant with pain contract thus far.    Viral URI  -     methylPREDNISolone acetate injection 40 mg; Inject 1 mL (40 mg total) into the muscle one time.  -     loratadine (CLARITIN) 10 mg tablet; Take 1 tablet (10 mg total) by mouth once daily.  New problem  Saline nasal spray PRN  Call for worsening sx or fever > 101 F      RTC PRN and as scheudled for routine

## 2018-01-15 ENCOUNTER — OFFICE VISIT (OUTPATIENT)
Dept: INTERNAL MEDICINE | Facility: CLINIC | Age: 48
End: 2018-01-15
Payer: MEDICAID

## 2018-01-15 VITALS
SYSTOLIC BLOOD PRESSURE: 132 MMHG | HEIGHT: 63 IN | WEIGHT: 159.81 LBS | RESPIRATION RATE: 18 BRPM | BODY MASS INDEX: 28.32 KG/M2 | DIASTOLIC BLOOD PRESSURE: 86 MMHG | HEART RATE: 123 BPM

## 2018-01-15 DIAGNOSIS — G47.00 INSOMNIA, UNSPECIFIED TYPE: Primary | ICD-10-CM

## 2018-01-15 DIAGNOSIS — M54.50 CHRONIC BILATERAL LOW BACK PAIN WITHOUT SCIATICA: ICD-10-CM

## 2018-01-15 DIAGNOSIS — Z72.0 TOBACCO USE: ICD-10-CM

## 2018-01-15 DIAGNOSIS — G89.29 CHRONIC BILATERAL LOW BACK PAIN WITHOUT SCIATICA: ICD-10-CM

## 2018-01-15 PROCEDURE — 99214 OFFICE O/P EST MOD 30 MIN: CPT | Mod: S$PBB,,, | Performed by: INTERNAL MEDICINE

## 2018-01-15 PROCEDURE — 99999 PR PBB SHADOW E&M-EST. PATIENT-LVL III: CPT | Mod: PBBFAC,,, | Performed by: INTERNAL MEDICINE

## 2018-01-15 PROCEDURE — 99213 OFFICE O/P EST LOW 20 MIN: CPT | Mod: PBBFAC | Performed by: INTERNAL MEDICINE

## 2018-01-15 NOTE — PROGRESS NOTES
"Subjective:       Patient ID: Sammi Pope is a 47 y.o. female.    Chief Complaint: 6 month checkup      HPI:  Patient is known to me and presents for follow up chronic low back with , tobacco use and insomnia. No labs done prior to today's visit.     Back pain: She is scheduled to see [facility] 4/2018. She is seeing phys med at [facility]. She is on Norco, declofenac and baclofen for pain management right now. [facility] started Lyrica but she didn't get it yet; asked her to call about this. She is in "alot of pain today due to the weather". Otherwise no changes.     Tobacco use: she reports increasing use since our last visit. Smoking 1.5 PPD. She is really not ready to quit and smoking is helping with her pain.     Insomnia: she is on elavil 25mg QHS. She reports she is not sleeping due to her pain.     Past Medical History:   Diagnosis Date    Bulging lumbar disc     Migraines        Family History   Problem Relation Age of Onset    Hypertension Mother     Cancer Father 60     lung    Diabetes Sister     Cancer Maternal Aunt      breast    Breast cancer Maternal Aunt     Colon cancer Neg Hx     Ovarian cancer Neg Hx        Social History     Social History    Marital status:      Spouse name: N/A    Number of children: N/A    Years of education: N/A     Occupational History    Not on file.     Social History Main Topics    Smoking status: Current Every Day Smoker     Packs/day: 1.00     Years: 6.00     Types: Cigarettes    Smokeless tobacco: Never Used    Alcohol use Yes      Comment: occ.    Drug use: No    Sexual activity: Yes     Partners: Male     Birth control/ protection: See Surgical Hx      Comment:      Other Topics Concern    Not on file     Social History Narrative    No narrative on file       Review of Systems   Constitutional: Negative for activity change, fatigue, fever and unexpected weight change.   HENT: Negative for congestion, ear pain, hearing loss, rhinorrhea and " sore throat.    Eyes: Negative for pain, redness and visual disturbance.   Respiratory: Negative for cough, shortness of breath and wheezing.    Cardiovascular: Negative for chest pain, palpitations and leg swelling.   Gastrointestinal: Negative for abdominal pain, constipation, diarrhea, nausea and vomiting.   Genitourinary: Negative for dysuria, frequency and urgency.   Musculoskeletal: Positive for back pain. Negative for joint swelling and neck pain.   Skin: Negative for color change, rash and wound.   Neurological: Negative for dizziness, tremors, weakness, light-headedness and headaches.         Objective:      Physical Exam   Constitutional: She is oriented to person, place, and time. She appears well-developed and well-nourished. No distress.   HENT:   Head: Normocephalic and atraumatic.   Right Ear: External ear normal.   Left Ear: External ear normal.   Eyes: Conjunctivae and EOM are normal. Pupils are equal, round, and reactive to light. Right eye exhibits no discharge. Left eye exhibits no discharge.   Neck: Neck supple. No tracheal deviation present.   Cardiovascular: Normal rate and regular rhythm.  Exam reveals no gallop and no friction rub.    No murmur heard.  Pulmonary/Chest: Effort normal and breath sounds normal. No respiratory distress. She has no wheezes. She has no rales.   Abdominal: Soft. Bowel sounds are normal. She exhibits no distension. There is no tenderness.   Lymphadenopathy:     She has no cervical adenopathy.   Neurological: She is alert and oriented to person, place, and time. No cranial nerve deficit.   Skin: Skin is warm and dry.   Psychiatric: She has a normal mood and affect. Her behavior is normal.   Vitals reviewed.      Assessment:       1. Insomnia, unspecified type    2. Tobacco use    3. Chronic bilateral low back pain without sciatica        Plan:       Sammi was seen today for 6 month checkup.    Diagnoses and all orders for this visit:    Insomnia, unspecified  type  Chronic controlled  Cont elavil at same dose    Tobacco use  increasing use  Not ready to quit  Declines meds, etc at this time  Will readdress as hopefully pain gets under better control  Spent > 10 mins in cessation counseling today    Chronic bilateral low back pain without sciatica  Cont current regimen  Sees phys med  Will see [facility] ortho 2018       Health Maintenance:  -CRC: not due  -PAP/MM2017  -Bone Density: not due  -tobacco: current smoker, not ready to quit  -Vaccines  Flu- declines    RTC 1 year and PRN

## 2018-01-15 NOTE — PATIENT INSTRUCTIONS
Back Care Tips    Caring for your back  These are things you can do to prevent a recurrence of acute back pain and to reduce symptoms from chronic back pain:  · Maintain a healthy weight. If you are overweight, losing weight will help most types of back pain.  · Exercise is an important part of recovery from most types of back pain. The muscles behind and in front of the spine support the back. This means strengthening both the back muscles and the abdominal muscles will provide better support for your spine.   · Swimming and brisk walking are good overall exercises to improve your fitness level.  · Practice safe lifting methods (below).  · Practice good posture when sitting, standing and walking. Avoid prolonged sitting. This puts more stress on the lower back than standing or walking.  · Wear quality shoes with sufficient arch support. Foot and ankle alignment can affect back symptoms. Women should avoid wearing high heels.  · Therapeutic massage can help relax the back muscles without stretching them.  · During the first 24 to 72 hours after an acute injury or flare-up of chronic back pain, apply an ice pack to the painful area for 20 minutes and then remove it for 20 minutes, over a period of 60 to 90 minutes, or several times a day. As a safety precaution, do not use a heating pad at bedtime. Sleeping on a heating pad can lead to skin burns or tissue damage.  · You can alternate ice and heat therapies.  Medications  Talk to your healthcare provider before using medicines, especially if you have other medical problems or are taking other medicines.  · You may use acetaminophen or ibuprofen to control pain, unless your healthcare provider prescribed other pain medicine. If you have chronic conditions like diabetes, liver or kidney disease, stomach ulcers, or gastrointestinal bleeding, or are taking blood thinners, talk with your healthcare provider before taking any medicines.  · Be careful if you are given  prescription pain medicines, narcotics, or medicine for muscle spasm. They can cause drowsiness, affect your coordination, reflexes, and judgment. Do not drive or operate heavy machinery while taking these types of medicines. Take prescription pain medicine only as prescribed by your healthcare provider.  Lumbar stretch  Here is a simple stretching exercise that will help relax muscle spasm and keep your back more limber. If exercise makes your back pain worse, dont do it.  · Lie on your back with your knees bent and both feet on the ground.  · Slowly raise your left knee to your chest as you flatten your lower back against the floor. Hold for 5 seconds.  · Relax and repeat the exercise with your right knee.  · Do 10 of these exercises for each leg.  Safe lifting method  · Dont bend over at the waist to lift an object off the floor.  Instead, bend your knees and hips in a squat.   · Keep your back and head upright  · Hold the object close to your body, directly in front of you.  · Straighten your legs to lift the object.   · Lower the object to the floor in the reverse fashion.  · If you must slide something across the floor, push it.  Posture tips  Sitting  Sit in chairs with straight backs or low-back support. Keep your knees lower than your hips, with your feet flat on the floor.  When driving, sit up straight. Adjust the seat forward so you are not leaning toward the steering wheel.  A small pillow or rolled towel behind your lower back may help if you are driving long distances.   Standing  When standing for long periods, shift most of your weight to one leg at a time. Alternate legs every few minutes.   Sleeping  The best way to sleep is on your side with your knees bent. Put a low pillow under your head to support your neck in a neutral spine position. Avoid thick pillows that bend your neck to one side. Put a pillow between your legs to further relax your lower back. If you sleep on your back, put pillows  under your knees to support your legs in a slightly flexed position. Use a firm mattress. If your mattress sags, replace it, or use a 1/2-inch plywood board under the mattress to add support.  Follow-up care  Follow up with your healthcare provider, or as advised.  If X-rays, a CT scan or an MRI scan were taken, they will be reviewed by a radiologist. You will be notified of any new findings that may affect your care.  Call 911  Seek emergency medical care if any of the following occur:  · Trouble breathing  · Confusion  · Very drowsy  · Fainting or loss of consciousness  · Rapid or very slow heart rate  · Loss of  bowel or bladder control  When to seek medical care  Call your healthcare provider if any of the following occur:  · Pain becomes worse or spreads to your arms or legs  · Weakness or numbness in one or both arms or legs  · Numbness in the groin area  Date Last Reviewed: 6/1/2016  © 8342-8829 [employer]. [address] [place] [address]. All rights reserved. This information is not intended as a substitute for professional medical care. Always follow your healthcare professional's instructions.        How to Quit Smoking  Smoking is one of the hardest habits to break. About half of all people who have ever smoked have been able to quit. Most people who still smoke want to quit. Here are some of the best ways to stop smoking.    Keep trying  Most smokers make many attempts at quitting before they are successful. Its important not to give up.  Go cold turkey  Most former smokers quit cold turkey (all at once). Trying to cut back gradually doesn't seem to work as well, perhaps because it continues the smoking habit. Also, it is possible to inhale more while smoking fewer cigarettes. This results in the same amount of nicotine in your body.  Get support  Support programs can be a big help, especially for heavy smokers. These groups offer lectures, ways to change behavior, and peer  support. Here are some ways to find a support program:  · Free national quitline: 800-QUIT-NOW (190-557-9379).  · Hospital quit-smoking programs.  · American Lung Association: (778.138.2658).  · American Cancer Society (366-583-4619).  Support at home is important too. Nonsmokers can offer praise and encouragement. If the smoker in your life finds it hard to quit, encourage them to keep trying.  Over-the-counter medicines  Nicotine replacement therapy may make quitting easier. Certain aids, such as the nicotine patch, gum, and lozenges, are available without a prescription. It is best to use these under a doctors care, though. The skin patch provides a steady supply of nicotine. Nicotine gum and lozenges give temporary bursts of low levels of nicotine. Both methods reduce the craving for cigarettes. Warning: If you have nausea, vomiting, dizziness, weakness, or a fast heartbeat, stop using these products and see your doctor.  Prescription medicines  After reviewing your smoking patterns and past attempts to quit, your doctor may offer a prescription medicine such as bupropion, varenicline, a nicotine inhaler, or nasal spray. Each has advantages and side effects. Your doctor can review these with you.  Health benefits of quitting  The benefits of quitting start right away and keep improving the longer you go without smoking. These benefits occur at any age.  So whether you are 17 or 70, quitting is a good decision. Some of the benefits include:  · 20 minutes: Blood pressure and pulse return to normal.  · 8 hours: Oxygen levels return to normal.  · 2 days: Ability to smell and taste begin to improve as damaged nerves regrow.  · 2 to 3 weeks: Circulation and lung function improve.  · 1 to 9 months: Coughing, congestion, and shortness of breath decrease; tiredness decreases.  · 1 year: Risk of heart attack decreases by half.  · 5 years: Risk of lung cancer decreases by half; risk of stroke becomes the same as a  "nonsmokers.  For more on how to quit smoking, try these online resources:   · Smokefree.gov  · "Clearing the Air" booklet from the National Cancer [place]: smokefree.gov/sites/default/files/pdf/clearing-the-air-accessible.pdf  Date Last Reviewed: 3/1/2017  © 8566-5402 [employer]. [address] [place] [address]. All rights reserved. This information is not intended as a substitute for professional medical care. Always follow your healthcare professional's instructions.        "

## 2018-01-17 DIAGNOSIS — G47.00 INSOMNIA, UNSPECIFIED TYPE: ICD-10-CM

## 2018-01-17 RX ORDER — AMITRIPTYLINE HYDROCHLORIDE 25 MG/1
25 TABLET, FILM COATED ORAL NIGHTLY PRN
Qty: 30 TABLET | Refills: 1 | Status: SHIPPED | OUTPATIENT
Start: 2018-01-17 | End: 2018-04-26 | Stop reason: SDUPTHER

## 2018-02-01 ENCOUNTER — TELEPHONE (OUTPATIENT)
Dept: INTERNAL MEDICINE | Facility: CLINIC | Age: 48
End: 2018-02-01

## 2018-02-01 DIAGNOSIS — M54.41 CHRONIC BILATERAL LOW BACK PAIN WITH BILATERAL SCIATICA: ICD-10-CM

## 2018-02-01 DIAGNOSIS — M54.42 CHRONIC BILATERAL LOW BACK PAIN WITH BILATERAL SCIATICA: ICD-10-CM

## 2018-02-01 DIAGNOSIS — G89.29 CHRONIC BILATERAL LOW BACK PAIN WITH BILATERAL SCIATICA: ICD-10-CM

## 2018-02-01 NOTE — TELEPHONE ENCOUNTER
----- Message from Marian Omer sent at 2018  9:07 AM CST -----  Contact: pt  Sammi Pope  MRN: [id number]  : 1970  PCP: Imelda Whitt  Home Phone      [phone]  Work Phone      Not on file.  Mobile          [phone]      MESSAGE:  Pt requesting refill on Diclofenac to be called in to [facility] in [place]. Please advise.  PHONE:  [phone]

## 2018-02-02 RX ORDER — DICLOFENAC SODIUM 50 MG/1
TABLET, DELAYED RELEASE ORAL
Qty: 60 TABLET | Refills: 0 | Status: SHIPPED | OUTPATIENT
Start: 2018-02-02 | End: 2018-02-26 | Stop reason: SDUPTHER

## 2018-02-05 DIAGNOSIS — G89.29 CHRONIC BILATERAL LOW BACK PAIN WITHOUT SCIATICA: ICD-10-CM

## 2018-02-05 DIAGNOSIS — M54.50 CHRONIC BILATERAL LOW BACK PAIN WITHOUT SCIATICA: ICD-10-CM

## 2018-02-05 NOTE — TELEPHONE ENCOUNTER
----- Message from Esdras Urbaon sent at 2018  8:12 AM CST -----  Contact: SELF  Sammi Pope  MRN: [id number]  : 1970  PCP: Imelda Whitt  Home Phone      [phone]  Work Phone      Not on file.  Mobile          [phone]      MESSAGE: NEEDS REFILL ON Tigerlily    PHONE: [phone]

## 2018-02-06 RX ORDER — HYDROCODONE BITARTRATE AND ACETAMINOPHEN 5; 325 MG/1; MG/1
1 TABLET ORAL EVERY 12 HOURS PRN
Qty: 60 TABLET | Refills: 0 | Status: SHIPPED | OUTPATIENT
Start: 2018-02-06 | End: 2018-03-07 | Stop reason: SDUPTHER

## 2018-02-19 ENCOUNTER — OFFICE VISIT (OUTPATIENT)
Dept: INTERNAL MEDICINE | Facility: CLINIC | Age: 48
End: 2018-02-19
Payer: MEDICAID

## 2018-02-19 VITALS
DIASTOLIC BLOOD PRESSURE: 100 MMHG | SYSTOLIC BLOOD PRESSURE: 132 MMHG | HEART RATE: 92 BPM | TEMPERATURE: 98 F | HEIGHT: 63 IN | WEIGHT: 161.38 LBS | OXYGEN SATURATION: 95 % | BODY MASS INDEX: 28.59 KG/M2 | RESPIRATION RATE: 18 BRPM

## 2018-02-19 DIAGNOSIS — G56.81 SUPRASCAPULAR NEUROPATHY, RIGHT: Primary | ICD-10-CM

## 2018-02-19 DIAGNOSIS — R03.0 ELEVATED BP WITHOUT DIAGNOSIS OF HYPERTENSION: ICD-10-CM

## 2018-02-19 PROCEDURE — 99214 OFFICE O/P EST MOD 30 MIN: CPT | Mod: PBBFAC | Performed by: NURSE PRACTITIONER

## 2018-02-19 PROCEDURE — 99214 OFFICE O/P EST MOD 30 MIN: CPT | Mod: S$PBB,,, | Performed by: NURSE PRACTITIONER

## 2018-02-19 PROCEDURE — 3008F BODY MASS INDEX DOCD: CPT | Mod: ,,, | Performed by: NURSE PRACTITIONER

## 2018-02-19 PROCEDURE — 99999 PR PBB SHADOW E&M-EST. PATIENT-LVL IV: CPT | Mod: PBBFAC,,, | Performed by: NURSE PRACTITIONER

## 2018-02-19 RX ORDER — METHYLPREDNISOLONE 4 MG/1
TABLET ORAL
Qty: 1 PACKAGE | Refills: 0 | Status: SHIPPED | OUTPATIENT
Start: 2018-02-19 | End: 2018-03-12

## 2018-02-19 RX ORDER — METHYLPREDNISOLONE ACETATE 40 MG/ML
40 INJECTION, SUSPENSION INTRA-ARTICULAR; INTRALESIONAL; INTRAMUSCULAR; SOFT TISSUE
Status: COMPLETED | OUTPATIENT
Start: 2018-02-19 | End: 2018-02-19

## 2018-02-19 RX ORDER — DIAZEPAM 2 MG/1
2 TABLET ORAL EVERY 8 HOURS PRN
Qty: 30 TABLET | Refills: 0 | Status: SHIPPED | OUTPATIENT
Start: 2018-02-19 | End: 2018-06-11

## 2018-02-19 RX ADMIN — METHYLPREDNISOLONE ACETATE 40 MG: 40 INJECTION, SUSPENSION INTRA-ARTICULAR; INTRALESIONAL; INTRAMUSCULAR; SOFT TISSUE at 11:02

## 2018-02-19 NOTE — PATIENT INSTRUCTIONS
Understanding Cervical Radiculopathy    Cervical radiculopathy is irritation or inflammation of a nerve root in the neck. It causes neck pain and other symptoms that may spread into the chest or down the arm. To understand this condition, it helps to understand the parts of the spine:  · Vertebrae. These are bones that stack to form the spine. The cervical spine contains the 7 vertebrae in the neck.  · Disks. These are soft pads of tissue between the vertebrae. They act as shock absorbers for the spine.  · The spinal canal. This is a tunnel formed within the stacked vertebrae. The spinal cord runs through this canal.  · Nerves. These branch off the spinal cord. As they leave the spinal canal, nerves pass through openings between the vertebrae. The nerve root is the part of the nerve that is closest to the spinal cord.   With cervical radiculopathy, nerve roots in the neck become irritated. This leads to pain and symptoms that can travel to the nerves that go from the spinal cord down the arms and into the torso.  What causes cervical radiculopathy?  Aging, injury, poor posture, and other issues can lead to problems in the neck. These problems may then irritate nerve roots. These include:  · Damage to a disk in the cervical spine. The damaged disk may then press on nearby nerve roots.  · Degeneration from wear and tear, and aging. This can lead to narrowing (stenosis) of the openings between the vertebrae. The narrowed openings press on nerve roots as they leave the spinal canal.  · An unstable spine. This is when a vertebra slips forward. It can then press on a nerve root.  There are other, less common causes of pressure on nerves in the neck. These include infection, cysts, and tumors.  Symptoms of cervical radiculopathy  These include:  · Neck pain  · Pain, numbness, tingling, or weakness that travels down the arm  · Loss of neck movement  · Muscle spasms  Treatment for cervical radiculopathy  In most cases,  your healthcare provider will first try treatments that help relieve symptoms. These may include:  · Prescription or over-the-counter pain medicines. These help relieve pain and swelling.  · Cold packs. These help reduce pain.  · Resting. This involves avoiding positions and activities that increase pain.  · Neck brace (cervical collar). This can help relieve inflammation and pain.  · Physical therapy, including exercises and stretches. This can help decrease pain and increase movement and function.  · Shots of medicinesaround the nerve roots. This is done to help relieve symptoms for a time.  In some cases, your healthcare provider may advise surgery to fix the underlying problem. This depends on the cause, the symptoms, and how long the pain has lasted.  Possible complications  Over time, an irritated and inflamed nerve may become damaged. This may lead to long-lasting (permanent) numbness or weakness. If symptoms change suddenly or get worse, be sure to let your healthcare provider know.     When to call your healthcare provider  Call your healthcare provider right away if you have any of these:  · New pain or pain that gets worse  · New or increasing weakness, numbness, or tingling in your arm or hand  · Bowel or bladder changes   Date Last Reviewed: 3/10/2016  © 2166-7612 [employer]. [address] [place] [address], [place] [address]. All rights reserved. This information is not intended as a substitute for professional medical care. Always follow your healthcare professional's instructions.        Head Tilt / Upper Trapezius Stretch (Flexibility)    1. Sit up straight in a chair with your head and neck in a neutral position, ears in line with shoulders. Hold the edge of your chair seat with your right hand. Tuck your chin in slightly.  2. Tilt your head to the left, while looking straight ahead.  3. Put your left hand on the right side of your head. Gently pull your head to the left. Hold for 30 to 60  seconds. Use gentle pressure to increase the stretch. Dont force your head into position.  4. Return your head and neck to the neutral position.  5. Repeat this exercise 2 times, or as instructed.  6. Switch sides and repeat 2 times, or as instructed.  Challenge yourself  Tuck one end of a towel under your left arm. Then bring the other end over your right shoulder. Pull the towel down on your right shoulder with both hands as you side-bend your head to the left. Repeat with the other side.   Date Last Reviewed: 3/10/2016  © 6216-7728 [employer]. [address] [place] [address], [place] [address]. All rights reserved. This information is not intended as a substitute for professional medical care. Always follow your healthcare professional's instructions.

## 2018-02-19 NOTE — PROGRESS NOTES
Subjective:       Patient ID: Sammi Pope is a 47 y.o. female.    Chief Complaint: Arm Pain (right arm pain)    New to me but seen previously in clinic by a fellow provider.     With h/o back pain presents with right arm pain. She is scheduled to see neurology @ [facility] 4/2018. She is seeing phys med at [facility]. She is on Norco, diclofenac and baclofen for pain management right now. Has not begun lyrica.     Lifted back with right arm x4 days ago. Pain began the next day.  Continues current regimen without improvement of pain.           Arm Pain    The incident occurred 3 to 5 days ago. The incident occurred at home. The pain is present in the right shoulder (posterior shoulder). The quality of the pain is described as burning, stabbing and shooting. The pain radiates to the right arm and right hand. The pain is at a severity of 9/10. The pain is severe. The pain has been fluctuating since the incident. Associated symptoms include muscle weakness, numbness and tingling. Pertinent negatives include no chest pain. The symptoms are aggravated by movement, palpation and lifting. She has tried NSAIDs, rest, immobilization and heat for the symptoms. The treatment provided no relief.     Review of Systems   Constitutional: Negative for activity change, appetite change, fever and unexpected weight change.   Respiratory: Negative for chest tightness and shortness of breath.    Cardiovascular: Negative for chest pain and palpitations.   Gastrointestinal: Negative for abdominal pain, constipation, diarrhea, nausea and vomiting.   Genitourinary: Negative for difficulty urinating and dysuria.   Musculoskeletal: Positive for arthralgias, back pain and myalgias. Negative for gait problem, joint swelling, neck pain and neck stiffness.   Skin: Negative for rash.   Neurological: Positive for tingling, weakness (right arm) and numbness. Negative for dizziness, tremors, seizures, syncope, facial asymmetry, speech difficulty,  light-headedness and headaches.   All other systems reviewed and are negative.      Objective:      Physical Exam   Constitutional: She is oriented to person, place, and time. Vital signs are normal. She appears well-developed and well-nourished. She is active and cooperative.   HENT:   Head: Normocephalic and atraumatic.   Right Ear: External ear normal.   Left Ear: External ear normal.   Nose: Nose normal.   Mouth/Throat: Oropharynx is clear and moist and mucous membranes are normal. Normal dentition.   Eyes: Conjunctivae and lids are normal. Pupils are equal, round, and reactive to light.   Neck: Trachea normal, normal range of motion and phonation normal. Neck supple.   Cardiovascular: Normal rate, regular rhythm, normal heart sounds and intact distal pulses.    Pulmonary/Chest: Effort normal and breath sounds normal. She has no wheezes. She has no rales.   Abdominal: Soft. Normal appearance and bowel sounds are normal. There is no tenderness.   Musculoskeletal:        Right shoulder: Normal.        Left shoulder: Normal.        Right elbow: Normal.       Left elbow: Normal.        Right wrist: Normal.        Left wrist: Normal.        Cervical back: She exhibits tenderness, pain and spasm. She exhibits normal range of motion, no bony tenderness, no swelling, no edema, no deformity, no laceration and normal pulse.        Thoracic back: Normal.        Back:         Right hand: Normal. Normal sensation noted. Normal strength noted.        Left hand: Normal. Normal sensation noted. Normal strength noted.   Right hand  weaker than left   Neurological: She is alert and oriented to person, place, and time. She has normal strength. She displays no atrophy and no tremor. No cranial nerve deficit or sensory deficit. She exhibits normal muscle tone. She displays a negative Romberg sign. She displays no seizure activity. Coordination and gait normal.   Skin: Skin is warm, dry and intact. No rash noted.   Psychiatric:  She has a normal mood and affect. Her speech is normal and behavior is normal. Judgment and thought content normal. Cognition and memory are normal.   Nursing note reviewed.      Assessment:       1. Suprascapular neuropathy, right    2. Elevated BP without diagnosis of hypertension        Plan:       1. Suprascapular neuropathy, right  Neurovascularly intact. Entrapment vs muscle spasm. Home exercises given. Emergency precautions discussed. Verbalized understanding. RTC if fails to improve or worsen.  - methylPREDNISolone acetate injection 40 mg; Inject 1 mL (40 mg total) into the muscle one time.  - diazePAM (VALIUM) 2 MG tablet; Take 1 tablet (2 mg total) by mouth every 8 (eight) hours as needed.  Dispense: 30 tablet; Refill: 0  - methylPREDNISolone (MEDROL DOSEPACK) 4 mg tablet; use as directed  Dispense: 1 Package; Refill: 0    2. Elevated BP without diagnosis of hypertension  Blood pressure is noted to be borderline elevated today, but normal in the past.    BAUTISTA Bellamy, FNP-C  Family/Internal Medicine  [facility]

## 2018-02-26 DIAGNOSIS — M54.42 CHRONIC BILATERAL LOW BACK PAIN WITH BILATERAL SCIATICA: ICD-10-CM

## 2018-02-26 DIAGNOSIS — M54.41 CHRONIC BILATERAL LOW BACK PAIN WITH BILATERAL SCIATICA: ICD-10-CM

## 2018-02-26 DIAGNOSIS — G89.29 CHRONIC BILATERAL LOW BACK PAIN WITH BILATERAL SCIATICA: ICD-10-CM

## 2018-02-26 RX ORDER — DICLOFENAC SODIUM 50 MG/1
TABLET, DELAYED RELEASE ORAL
Qty: 60 TABLET | Refills: 0 | Status: SHIPPED | OUTPATIENT
Start: 2018-02-26 | End: 2018-04-03 | Stop reason: SDUPTHER

## 2018-02-28 ENCOUNTER — TELEPHONE (OUTPATIENT)
Dept: INTERNAL MEDICINE | Facility: CLINIC | Age: 48
End: 2018-02-28

## 2018-02-28 NOTE — TELEPHONE ENCOUNTER
Contacted pt about her pain spoke to provider Marian Perez she said she would be able to up pt current medication that was prescribed to her but she could not give no more Narcotics pt said she did not want her meds to be changed wanted them to stay as they were pt verbalized understanding.

## 2018-02-28 NOTE — TELEPHONE ENCOUNTER
Spoke to pt she said she is still having pain in right shoulder she said she has done exercise taken steroids and taking volume and still hurt wanting to know if she needs to be seen again or what would provider recommend ? Please advise

## 2018-02-28 NOTE — TELEPHONE ENCOUNTER
Has patient begun lyrica? If not, is there a plan to begin? Unable to give anything stronger for pain though we can adjust her current medications if she has not begun lyrica yet.

## 2018-02-28 NOTE — TELEPHONE ENCOUNTER
----- Message from Esdras Urbano sent at 2018 12:35 PM CST -----  Contact: SELF  Sammi Pope  MRN: [id number]  : 1970  PCP: Imelda Whitt  Home Phone      [phone]  Work Phone      Not on file.  Mobile          [phone]      MESSAGE: WAS SEEN LAST WEEK FOR PAIN IN HER ARM AND IT STILL HASN'T GOTTEN BETTER. SHOULD SHE COME IN FOR ANOTHER CHECKUP OR WHAT WOULD BE HER NEXT STEP? PLEASE CALL TO DISCUSS     PHONE: [phone]

## 2018-03-07 DIAGNOSIS — G89.29 CHRONIC BILATERAL LOW BACK PAIN WITHOUT SCIATICA: ICD-10-CM

## 2018-03-07 DIAGNOSIS — M54.50 CHRONIC BILATERAL LOW BACK PAIN WITHOUT SCIATICA: ICD-10-CM

## 2018-03-07 RX ORDER — HYDROCODONE BITARTRATE AND ACETAMINOPHEN 5; 325 MG/1; MG/1
1 TABLET ORAL EVERY 12 HOURS PRN
Qty: 60 TABLET | Refills: 0 | Status: SHIPPED | OUTPATIENT
Start: 2018-03-07 | End: 2018-04-09 | Stop reason: SDUPTHER

## 2018-03-07 NOTE — TELEPHONE ENCOUNTER
----- Message from Esdras Urbano sent at 3/7/2018  8:06 AM CST -----  Contact: SELF  Sammi Pope  MRN: [id number]  : 1970  PCP: Imelda Whitt  Home Phone      [phone]  Work Phone      Not on file.  Mobile          [phone]      MESSAGE: NEEDS REFILL ON VICODIN     PHONE: [phone]

## 2018-04-03 DIAGNOSIS — M54.41 CHRONIC BILATERAL LOW BACK PAIN WITH BILATERAL SCIATICA: ICD-10-CM

## 2018-04-03 DIAGNOSIS — G89.29 CHRONIC BILATERAL LOW BACK PAIN WITH BILATERAL SCIATICA: ICD-10-CM

## 2018-04-03 DIAGNOSIS — M54.42 CHRONIC BILATERAL LOW BACK PAIN WITH BILATERAL SCIATICA: ICD-10-CM

## 2018-04-04 RX ORDER — DICLOFENAC SODIUM 50 MG/1
TABLET, DELAYED RELEASE ORAL
Qty: 60 TABLET | Refills: 11 | Status: ON HOLD | OUTPATIENT
Start: 2018-04-04 | End: 2019-03-13

## 2018-04-09 ENCOUNTER — TELEPHONE (OUTPATIENT)
Dept: INTERNAL MEDICINE | Facility: CLINIC | Age: 48
End: 2018-04-09

## 2018-04-09 DIAGNOSIS — M54.50 CHRONIC BILATERAL LOW BACK PAIN WITHOUT SCIATICA: ICD-10-CM

## 2018-04-09 DIAGNOSIS — G89.29 CHRONIC BILATERAL LOW BACK PAIN WITHOUT SCIATICA: ICD-10-CM

## 2018-04-09 RX ORDER — HYDROCODONE BITARTRATE AND ACETAMINOPHEN 5; 325 MG/1; MG/1
1 TABLET ORAL EVERY 12 HOURS PRN
Qty: 60 TABLET | Refills: 0 | Status: SHIPPED | OUTPATIENT
Start: 2018-04-09 | End: 2018-05-09 | Stop reason: SDUPTHER

## 2018-04-09 NOTE — TELEPHONE ENCOUNTER
----- Message from Esdras Urbano sent at 2018  8:20 AM CDT -----  Contact: SELF  Sammi Pope  MRN: [id number]  : 1970  PCP: Imelda Whitt  Home Phone      [phone]  Work Phone      Not on file.  Mobile          [phone]      MESSAGE: NEEDS REFILL ON VICODIN    PHONE: [phone]

## 2018-04-26 DIAGNOSIS — G47.00 INSOMNIA, UNSPECIFIED TYPE: ICD-10-CM

## 2018-04-26 NOTE — TELEPHONE ENCOUNTER
----- Message from Marian Omer sent at 2018 10:00 AM CDT -----  Contact: pt  Sammi Pope  MRN: [id number]  : 1970  PCP: Imelda Whitt  Home Phone      [phone]  Work Phone      Not on file.  Mobile          [phone]      MESSAGE:   Pt requesting refill or new Rx.   Is this a refill or new RX:  Refill  RX name and strength: amitriptyline (ELAVIL) 25 MG   Last office visit: 18  Is this a 30-day or 90-day RX:  30  Ph[facility] and location:  [facility] [place]  Comments:      Phone:  [phone]

## 2018-04-27 RX ORDER — AMITRIPTYLINE HYDROCHLORIDE 25 MG/1
25 TABLET, FILM COATED ORAL NIGHTLY PRN
Qty: 30 TABLET | Refills: 5 | Status: ON HOLD | OUTPATIENT
Start: 2018-04-27 | End: 2019-03-13

## 2018-05-09 DIAGNOSIS — M54.50 CHRONIC BILATERAL LOW BACK PAIN WITHOUT SCIATICA: ICD-10-CM

## 2018-05-09 DIAGNOSIS — G89.29 CHRONIC BILATERAL LOW BACK PAIN WITHOUT SCIATICA: ICD-10-CM

## 2018-05-09 RX ORDER — HYDROCODONE BITARTRATE AND ACETAMINOPHEN 5; 325 MG/1; MG/1
1 TABLET ORAL EVERY 12 HOURS PRN
Qty: 60 TABLET | Refills: 0 | Status: SHIPPED | OUTPATIENT
Start: 2018-05-09 | End: 2018-06-11 | Stop reason: SDUPTHER

## 2018-05-09 NOTE — TELEPHONE ENCOUNTER
----- Message from Marian Omer sent at 2018  8:12 AM CDT -----  Contact: pt  Sammi Pope  MRN: [id number]  : 1970  PCP: Imelda Whitt  Home Phone      [phone]  Work Phone      Not on file.  Mobile          [phone]      MESSAGE:   Pt requesting refill or new Rx.   Is this a refill or new RX:  refill  RX name and strength: norco  Last office visit: 18  Is this a 30-day or 90-day RX:  30  Ph[facility] and location:  [facility] [place]  Comments:      Phone:  [phone]

## 2018-05-28 ENCOUNTER — OFFICE VISIT (OUTPATIENT)
Dept: URGENT CARE | Facility: CLINIC | Age: 48
End: 2018-05-28
Payer: MEDICAID

## 2018-05-28 VITALS
OXYGEN SATURATION: 99 % | SYSTOLIC BLOOD PRESSURE: 120 MMHG | DIASTOLIC BLOOD PRESSURE: 85 MMHG | BODY MASS INDEX: 28.53 KG/M2 | HEIGHT: 63 IN | HEART RATE: 95 BPM | TEMPERATURE: 99 F | WEIGHT: 161 LBS

## 2018-05-28 DIAGNOSIS — F17.200 SMOKER: ICD-10-CM

## 2018-05-28 DIAGNOSIS — J06.9 UPPER RESPIRATORY TRACT INFECTION, UNSPECIFIED TYPE: Primary | ICD-10-CM

## 2018-05-28 PROCEDURE — 99214 OFFICE O/P EST MOD 30 MIN: CPT | Mod: S$GLB,,, | Performed by: PHYSICIAN ASSISTANT

## 2018-05-28 RX ORDER — FLUTICASONE PROPIONATE 50 MCG
1 SPRAY, SUSPENSION (ML) NASAL DAILY
Qty: 16 G | Refills: 0 | Status: SHIPPED | OUTPATIENT
Start: 2018-05-28 | End: 2018-07-20

## 2018-05-28 RX ORDER — BENZONATATE 200 MG/1
200 CAPSULE ORAL 3 TIMES DAILY PRN
Qty: 30 CAPSULE | Refills: 0 | Status: SHIPPED | OUTPATIENT
Start: 2018-05-28 | End: 2018-06-07

## 2018-05-28 RX ORDER — DEXAMETHASONE SODIUM PHOSPHATE 100 MG/10ML
7 INJECTION INTRAMUSCULAR; INTRAVENOUS
Status: COMPLETED | OUTPATIENT
Start: 2018-05-28 | End: 2018-05-28

## 2018-05-28 RX ORDER — CEFDINIR 300 MG/1
300 CAPSULE ORAL 2 TIMES DAILY
Qty: 20 CAPSULE | Refills: 0 | Status: SHIPPED | OUTPATIENT
Start: 2018-05-28 | End: 2018-06-07

## 2018-05-28 RX ADMIN — DEXAMETHASONE SODIUM PHOSPHATE 7 MG: 100 INJECTION INTRAMUSCULAR; INTRAVENOUS at 09:05

## 2018-05-28 NOTE — PROGRESS NOTES
"Subjective:       Patient ID: Sammi Pope is a 47 y.o. female.    Vitals:  height is 5' 3" (1.6 m) and weight is 73 kg (161 lb). Her oral temperature is 99 °F (37.2 °C). Her blood pressure is 120/85 and her pulse is 95. Her oxygen saturation is 99%.     Chief Complaint: Cough    Cough   This is a new problem. Episode onset: 5 days ago. The problem has been gradually worsening. The problem occurs every few minutes. The cough is productive of sputum. Associated symptoms include postnasal drip, a sore throat and sweats. Pertinent negatives include no chest pain, chills, ear pain, eye redness, fever, headaches, myalgias, nasal congestion, shortness of breath or wheezing. The symptoms are aggravated by lying down. Treatments tried: mucinex. The treatment provided no relief.     Review of Systems   Constitution: Negative for chills, fever and malaise/fatigue.   HENT: Positive for congestion, postnasal drip and sore throat. Negative for ear pain and hoarse voice.    Eyes: Negative for discharge and redness.   Cardiovascular: Negative for chest pain, dyspnea on exertion and leg swelling.   Respiratory: Positive for cough and sputum production. Negative for shortness of breath and wheezing.    Musculoskeletal: Negative for myalgias.   Gastrointestinal: Negative for abdominal pain and nausea.   Neurological: Negative for headaches.       Objective:      Physical Exam   Constitutional: She is oriented to person, place, and time. She appears well-developed and well-nourished. She is cooperative.  Non-toxic appearance. She does not appear ill. No distress.   HENT:   Head: Normocephalic and atraumatic.   Right Ear: Hearing, tympanic membrane, external ear and ear canal normal.   Left Ear: Hearing, tympanic membrane, external ear and ear canal normal.   Nose: Mucosal edema present. No rhinorrhea or nasal deformity. No epistaxis. Right sinus exhibits no maxillary sinus tenderness and no frontal sinus tenderness. Left sinus " exhibits no maxillary sinus tenderness and no frontal sinus tenderness.   Mouth/Throat: Uvula is midline, oropharynx is clear and moist and mucous membranes are normal. No trismus in the jaw. Normal dentition. No uvula swelling. No posterior oropharyngeal erythema.   Eyes: Conjunctivae and lids are normal. No scleral icterus.   Sclera clear bilat   Neck: Trachea normal, full passive range of motion without pain and phonation normal. Neck supple.   Cardiovascular: Normal rate, regular rhythm, normal heart sounds, intact distal pulses and normal pulses.    Pulmonary/Chest: Effort normal and breath sounds normal. No respiratory distress. She has no decreased breath sounds. She has no wheezes. She has no rhonchi. She has no rales.   cough   Abdominal: Soft. Normal appearance and bowel sounds are normal. She exhibits no distension. There is no tenderness.   Musculoskeletal: Normal range of motion. She exhibits no edema or deformity.   Lymphadenopathy:     She has no cervical adenopathy.   Neurological: She is alert and oriented to person, place, and time. She exhibits normal muscle tone. Coordination normal.   Skin: Skin is warm, dry and intact. She is not diaphoretic. No pallor.   Psychiatric: She has a normal mood and affect. Her speech is normal and behavior is normal. Judgment and thought content normal. Cognition and memory are normal.   Nursing note and vitals reviewed.      Assessment:       1. Upper respiratory tract infection, unspecified type    2. Smoker        Plan:         Upper respiratory tract infection, unspecified type  -     dexamethasone injection 7 mg; Inject 0.7 mLs (7 mg total) into the muscle one time.  -     benzonatate (TESSALON) 200 MG capsule; Take 1 capsule (200 mg total) by mouth 3 (three) times daily as needed for Cough.  Dispense: 30 capsule; Refill: 0  -     fluticasone (FLONASE) 50 mcg/actuation nasal spray; 1 spray (50 mcg total) by Each Nare route once daily.  Dispense: 16 g; Refill:  0    Smoker  -     Ambulatory referral to Smoking Cessation Program    Other orders  -     cefdinir (OMNICEF) 300 MG capsule; Take 1 capsule (300 mg total) by mouth 2 (two) times daily.  Dispense: 20 capsule; Refill: 0      Patient Instructions   · Follow up with your primary care in 2-5 days if symptoms have not improved, or you may return here.  · If you were referred to a specialist, please follow up with that specialty.  · If you were prescribed antibiotics, please take them to completion.  · If you were prescribed a narcotic or any medication with sedative effects, do not drive or operate heavy equipment or machinery while taking these medications.  · You must understand that you have received treatment at an Urgent Care facility only, and that you may be released before all of your medical problems are known or treated. Urgent Care facilities are not equipped to handle life threatening emergencies. It is recommended that you go to an Emergency Department for further evaluation of worsening or concerning symptoms, or possibly life threatening conditions as discussed.                                        If you  smoke, please stop smoking          You have been given a prescription for antibiotics. Your symptoms will likely resolve without antibiotics. It is recommended that you wait 3-5 days to fill this prescription and begin the course of antibiotics, and that you only do so if your symptoms do not resolve or they worsen. It is important to follow these instructions due to the problem of increasing antibiotic resistance.      Symptomatic treatment:    Alternate tylenol and motrin every 4-6 hours  salt water gargles  Cold-eeze helps to reduce the duration of sore throat symptoms  Cepachol helps to numb the discomfort  Chloroseptic spray  Nasal saline spray reduces inflammation and dryness  Warm face compresses as often as you can  Vicks vapor rub at night  Flonase OTC or Nasacort OTC  Simple foods like  chicken noodle soup help  Mucinex DM or use Coricidin HBP if you have hypertension  Zyrtec/Claritin/Xyzal during the day and Benadryl at night may help if allergy component   Pedialyte helps with dehydration if lacking appetite  Rest as much as you can

## 2018-06-08 ENCOUNTER — TELEPHONE (OUTPATIENT)
Dept: INTERNAL MEDICINE | Facility: CLINIC | Age: 48
End: 2018-06-08

## 2018-06-08 NOTE — TELEPHONE ENCOUNTER
Attempted to contact pt to inform her that the only provider we have here that can refill chronic pain meds, will not refill without a pain appt in the last 3 months. I scheduled her with Dr Ramon on Monday afternoon at 4pm for a med refill.

## 2018-06-08 NOTE — TELEPHONE ENCOUNTER
----- Message from Esdras Urbano sent at 2018  2:23 PM CDT -----  Contact: SELF  Sammi Pope  MRN: [id number]  : 1970  PCP: Imelda Whitt  Home Phone      [phone]  Work Phone      Not on file.  Mobile          [phone]      MESSAGE: WANTS TO KNOW IF NORCO CAN BE FILLED TODAY SINCE IT'S DUE FOR REFILL ON .     PHONE: [phone]

## 2018-06-11 ENCOUNTER — OFFICE VISIT (OUTPATIENT)
Dept: FAMILY MEDICINE | Facility: CLINIC | Age: 48
End: 2018-06-11
Payer: MEDICAID

## 2018-06-11 VITALS
RESPIRATION RATE: 16 BRPM | DIASTOLIC BLOOD PRESSURE: 92 MMHG | HEIGHT: 63 IN | WEIGHT: 166 LBS | SYSTOLIC BLOOD PRESSURE: 146 MMHG | HEART RATE: 100 BPM | BODY MASS INDEX: 29.41 KG/M2

## 2018-06-11 DIAGNOSIS — J20.9 ACUTE BRONCHITIS, UNSPECIFIED ORGANISM: Primary | ICD-10-CM

## 2018-06-11 DIAGNOSIS — G47.00 INSOMNIA, UNSPECIFIED TYPE: ICD-10-CM

## 2018-06-11 DIAGNOSIS — M54.50 CHRONIC BILATERAL LOW BACK PAIN WITHOUT SCIATICA: ICD-10-CM

## 2018-06-11 DIAGNOSIS — Z72.0 TOBACCO USE: ICD-10-CM

## 2018-06-11 DIAGNOSIS — G89.29 CHRONIC BILATERAL LOW BACK PAIN WITHOUT SCIATICA: ICD-10-CM

## 2018-06-11 PROCEDURE — 99999 PR PBB SHADOW E&M-EST. PATIENT-LVL III: CPT | Mod: PBBFAC,,, | Performed by: FAMILY MEDICINE

## 2018-06-11 PROCEDURE — 99214 OFFICE O/P EST MOD 30 MIN: CPT | Mod: S$PBB,,, | Performed by: FAMILY MEDICINE

## 2018-06-11 PROCEDURE — 99213 OFFICE O/P EST LOW 20 MIN: CPT | Mod: PBBFAC | Performed by: FAMILY MEDICINE

## 2018-06-11 RX ORDER — HYDROCODONE BITARTRATE AND ACETAMINOPHEN 5; 325 MG/1; MG/1
1 TABLET ORAL EVERY 12 HOURS PRN
Qty: 60 TABLET | Refills: 0 | Status: SHIPPED | OUTPATIENT
Start: 2018-06-11 | End: 2018-07-10 | Stop reason: SDUPTHER

## 2018-06-11 NOTE — PROGRESS NOTES
Subjective:       Patient ID: Sammi Pope is a 48 y.o. female.    Chief Complaint: Follow-up (discuss medication )    Patient usually sees Dr. Whitt.  She is here for a refill on her hydrocodone.  She takes hydrocodone 5 mg twice daily, Lyrica 75 mg by mouth twice daily, diclofenac twice daily.  She seems to have chronic lumbar pain that radiates into her legs.  I reviewed the MRI and she has minimal disc bulging at L4-5 area.  Minimal foraminal  narrowing.  She is being followed by physical medicine.  They started her on Lyrica which has helped a little.  She was treated 2 weeks ago for upper respiratory infection with Omnicef, steroid shot.  She still has a persistent cough.  She is still smoking 1 pack per day.      Review of Systems   Constitutional: Negative for activity change, chills, fatigue, fever and unexpected weight change.   HENT: Positive for congestion. Negative for sore throat and trouble swallowing.    Respiratory: Positive for cough. Negative for chest tightness and shortness of breath.    Cardiovascular: Negative for chest pain and leg swelling.   Gastrointestinal: Negative for abdominal pain.   Endocrine: Negative for cold intolerance and heat intolerance.   Genitourinary: Negative for difficulty urinating.   Musculoskeletal: Positive for back pain. Negative for joint swelling.   Skin: Negative for rash.   Neurological: Negative for numbness.   Hematological: Negative for adenopathy.   Psychiatric/Behavioral: Negative for decreased concentration.       Objective:      Vitals:    06/11/18 1531   BP: (!) 146/92   Pulse: 100   Resp: 16     Physical Exam   Constitutional: She is oriented to person, place, and time. She appears well-developed and well-nourished.   HENT:   Head: Normocephalic and atraumatic.   Eyes: EOM are normal. Pupils are equal, round, and reactive to light.   Neck: Normal range of motion. Neck supple.   Cardiovascular: Normal rate, regular rhythm, normal heart sounds and  intact distal pulses.  Exam reveals no gallop and no friction rub.    No murmur heard.  Pulmonary/Chest: Effort normal and breath sounds normal.   Abdominal: Soft. Bowel sounds are normal. There is no tenderness.   Musculoskeletal: She exhibits tenderness.   Back: Moderate tenderness and spasm the lumbar region, negative straight leg raise, DTRs are 2+ and equal in knees and ankles.  Strength in the legs is 5+ equally.  Straight leg raise did cause lumbar pain and mild pain into the leg.  She has an antalgic gait.   Neurological: She is alert and oriented to person, place, and time. No cranial nerve deficit.   Psychiatric: She has a normal mood and affect. Her behavior is normal.       Assessment:       1. Acute bronchitis, unspecified organism    2. Chronic bilateral low back pain without sciatica    3. Insomnia, unspecified type    4. Tobacco use        Plan:   Sammi was seen today for follow-up.    Diagnoses and all orders for this visit:    Acute bronchitis, unspecified organism  She's been fully treated.  Her cough should slowly resolve.  Encouraged patient to quit smoking.  Return to clinic if respiratory symptoms would worsen.    Chronic bilateral low back pain without sciatica  -     HYDROcodone-acetaminophen (NORCO) 5-325 mg per tablet; Take 1 tablet by mouth every 12 (twelve) hours as needed for Pain.  Continue baclofen 20 mg twice daily  Continue diclofenac 50 mg twice daily  Continue Lyrica 75 mg twice daily  Keep appointment with physical medicine    Insomnia, unspecified type  Continue amitriptyline 25 mg twice daily    Tobacco use  Encouraged patient to quit smoking.  Told her that nicotine dependence to worsen pain    Follow-up with Dr. Whitt in 3 months.  Patient was told to call her for refills.

## 2018-06-21 ENCOUNTER — TELEPHONE (OUTPATIENT)
Dept: INTERNAL MEDICINE | Facility: CLINIC | Age: 48
End: 2018-06-21

## 2018-06-21 DIAGNOSIS — J06.9 UPPER RESPIRATORY TRACT INFECTION, UNSPECIFIED TYPE: ICD-10-CM

## 2018-06-21 NOTE — TELEPHONE ENCOUNTER
----- Message from Sonia Malone MA sent at 2018 11:42 AM CDT -----  Contact: Self  Sammi Pope  MRN: [id number]  : 1970  PCP: Imelda Whitt  Home Phone      [phone]  Work Phone      Not on file.  Mobile          [phone]      MESSAGE: Patient would like to start Nicoderm patches, but wants to know if that will interfere with the current medications she's taking now. See medication card and advise.    Phone number: [phone]

## 2018-06-25 RX ORDER — FLUTICASONE PROPIONATE 50 MCG
SPRAY, SUSPENSION (ML) NASAL
Qty: 16 ML | Refills: 0 | OUTPATIENT
Start: 2018-06-25

## 2018-07-08 ENCOUNTER — OFFICE VISIT (OUTPATIENT)
Dept: URGENT CARE | Facility: CLINIC | Age: 48
End: 2018-07-08
Payer: MEDICAID

## 2018-07-08 VITALS
OXYGEN SATURATION: 99 % | DIASTOLIC BLOOD PRESSURE: 93 MMHG | SYSTOLIC BLOOD PRESSURE: 143 MMHG | TEMPERATURE: 98 F | HEART RATE: 101 BPM

## 2018-07-08 DIAGNOSIS — L23.7 ALLERGIC CONTACT DERMATITIS DUE TO PLANTS, EXCEPT FOOD: Primary | ICD-10-CM

## 2018-07-08 PROCEDURE — 99214 OFFICE O/P EST MOD 30 MIN: CPT | Mod: S$GLB,,, | Performed by: FAMILY MEDICINE

## 2018-07-08 RX ORDER — HYDROXYZINE HYDROCHLORIDE 25 MG/1
25 TABLET, FILM COATED ORAL EVERY 6 HOURS PRN
Qty: 20 TABLET | Refills: 0 | Status: SHIPPED | OUTPATIENT
Start: 2018-07-08 | End: 2018-07-20

## 2018-07-08 RX ORDER — PREDNISONE 10 MG/1
TABLET ORAL
Qty: 30 TABLET | Refills: 0 | Status: SHIPPED | OUTPATIENT
Start: 2018-07-08 | End: 2018-07-20

## 2018-07-08 RX ORDER — MOMETASONE FUROATE 1 MG/G
CREAM TOPICAL
Qty: 45 G | Refills: 0 | Status: SHIPPED | OUTPATIENT
Start: 2018-07-08 | End: 2018-07-20

## 2018-07-08 NOTE — PROGRESS NOTES
Subjective:       Patient ID: Sammi Pope is a 48 y.o. female.    Vitals:  oral temperature is 98.3 °F (36.8 °C). Her blood pressure is 143/93 (abnormal) and her pulse is 101. Her oxygen saturation is 99%.     Chief Complaint: Rash (chest, legs and back)    Rash   This is a new problem. The current episode started yesterday. The problem is unchanged. The affected locations include the face, chest and back (both legs). The rash is characterized by redness. It is unknown if there was an exposure to a precipitant. Pertinent negatives include no diarrhea, fever, joint pain, shortness of breath, sore throat or vomiting. Past treatments include nothing. The treatment provided no relief.     Review of Systems   Constitution: Negative for chills and fever.   HENT: Negative for sore throat.    Eyes: Negative for blurred vision.   Cardiovascular: Negative for chest pain.   Respiratory: Negative for shortness of breath.    Skin: Positive for itching and rash.   Musculoskeletal: Negative for back pain and joint pain.   Gastrointestinal: Negative for abdominal pain, diarrhea, nausea and vomiting.   Neurological: Negative for headaches.   Psychiatric/Behavioral: The patient is not nervous/anxious.        Objective:      Physical Exam   Constitutional: She is oriented to person, place, and time. She appears well-developed and well-nourished.   HENT:   Head: Normocephalic and atraumatic. Head is without abrasion, without contusion and without laceration.   Right Ear: External ear normal.   Left Ear: External ear normal.   Nose: Nose normal.   Mouth/Throat: Oropharynx is clear and moist.   Eyes: Conjunctivae, EOM and lids are normal. Pupils are equal, round, and reactive to light.   Neck: Trachea normal, full passive range of motion without pain and phonation normal. Neck supple.   Cardiovascular: Normal rate, regular rhythm and normal heart sounds.    Pulmonary/Chest: Effort normal and breath sounds normal. No stridor. No  respiratory distress.   Musculoskeletal: Normal range of motion.   Neurological: She is alert and oriented to person, place, and time.   Skin: Skin is warm, dry and intact. Capillary refill takes less than 2 seconds. Rash noted. No abrasion, no bruising, no burn, no ecchymosis, no laceration and no lesion noted. Rash is papular. There is erythema.        Psychiatric: She has a normal mood and affect. Her speech is normal and behavior is normal. Judgment and thought content normal. Cognition and memory are normal.   Nursing note and vitals reviewed.      Assessment:       1. Allergic contact dermatitis due to plants, except food        Plan:         Allergic contact dermatitis due to plants, except food  -     hydrOXYzine HCl (ATARAX) 25 MG tablet; Take 1 tablet (25 mg total) by mouth every 6 (six) hours as needed for Itching.  Dispense: 20 tablet; Refill: 0  -     mometasone 0.1% (ELOCON) 0.1 % cream; Apply to affected area twice a day  Dispense: 45 g; Refill: 0  -     predniSONE (DELTASONE) 10 MG tablet; 4 tablets once a day for 4 days then 2 tablets once a day for 4 days then 1 tablet once a day for 4 days  Dispense: 30 tablet; Refill: 0      Please drink plenty of fluids.  Please get plenty of rest.  Please return here or go to the Emergency Department for any concerns or worsening of condition.  If you were given a steroid shot in the clinic and have also been given a prescription for a steroid such as Prednisone or a Medrol Dose Pack, please begin taking them tomorrow.  If you have a localized reaction it is ok to apply topical Benadryl and/or Cortaid as directed to the affected area.  You can take over the counter Allegra or Claritin or Zyrtec as directed during the daytime hours as these generally do not cause drowsiness.    Use Hydrocortisone 1% cream for rash on face.  Do not put stronger steroid cream on your face.  If you  smoke, please stop smoking.       Please follow up with your primary care doctor or  specialist as needed.    Imelda Whitt MD  [phone]

## 2018-07-08 NOTE — PATIENT INSTRUCTIONS
"Please drink plenty of fluids.  Please get plenty of rest.  Please return here or go to the Emergency Department for any concerns or worsening of condition.  If you were given a steroid shot in the clinic and have also been given a prescription for a steroid such as Prednisone or a Medrol Dose Pack, please begin taking them tomorrow.  If you have a localized reaction it is ok to apply topical Benadryl and/or Cortaid as directed to the affected area.  You can take over the counter Allegra or Claritin or Zyrtec as directed during the daytime hours as these generally do not cause drowsiness.    Use Hydrocortisone 1% cream for rash on face.  Do not put stronger steroid cream on your face.  If you  smoke, please stop smoking.       Please follow up with your primary care doctor or specialist as needed.    Imelda Whitt MD  [phone]      Contact Dermatitis  Contact dermatitis is a skin rash caused by something that touches the skin and makes it irritated and inflamed. Your skin may be red, swollen, dry, and may be cracked. Blisters may form and ooze. The rash will itch.  Contact dermatitis can form on the face and neck, backs of hands, forearms, genitals, and lower legs.  People can get contact dermatitis from lots of sources. These include:  · Plants such as poison ivy, oak, or sumac  · Chemicals in hair dyes and rinses, soaps, solvents, waxes, fingernail polish, and deodorants   · Jewelry or watchbands made of nickel  Contact dermatitis is not passed from person to person.  Talk with your healthcare provider about what may have caused the rash. A type of allergy testing called "patch testing" may be used to discover what you are allergic to. You will need to avoid the source of your rash in the future to prevent it from coming back.  Treatment is done to relieve itching and prevent the rash from coming back. The rash should go away in a few days to a few weeks.  Home care  Your healthcare provider may " prescribe medicine to relieve swelling and itching. Follow all instructions when using these medicines.  General care:  · Avoid anything that heats up your skin, such as hot showers or baths, or direct sunlight. This can make itching worse.  · Apply cold compresses to soothe your sores to help relieve your symptoms. Do this for 30 minutes 3 to 4 times a day. You can make a cold compress by soaking a cloth in cold water. Squeeze out excess water. You can add colloidal oatmeal to the water to help reduce itching. For severe itching in a small area, apply an ice pack wrapped in a thin towel. Do this for 20 minutes 3 to 4 times a day.  · You can also try wet dressings. One way to do this is to wear a wet piece of clothing under a dry one. Wear a damp shirt under a dry shirt if your upper body is affected. This can relieve itching and prevent you from scratching the affected area.  · You can also help relieve large areas of itching by taking a lukewarm bath with colloidal oatmeal added to the water.  · Use hydrocortisone cream for redness and irritation, unless another medicine was prescribed. You can also use benzocaine anesthetic cream or spray. Calamine lotion can also relieve mild symptoms.  · Use oral diphenhydramine to help reduce itching. You can buy this antihistamine at drug and grocery stores. It can make you sleepy, so use lower doses during the daytime. Or you can use loratadine. This is an antihistamine that will not make you sleepy. Do not use diphenhydramine if you have glaucoma or have trouble urinating due to an enlarged prostate.  · If a plant causes your rash, make sure to wash your skin and the clothes you were wearing when you came into contact with the plant. This is to wash away the plant oils that gave you the rash and prevent more or worse symptoms.  · Stay away from the substance or object that causes your symptoms. If you cant avoid it, wear gloves or some other type of protection.  Follow-up  care  Follow up with your healthcare provider, or as advised.  When to seek medical advice  Call your healthcare provider right away if any of these occur:  · Spreading of the rash to other parts of your body  · Severe swelling of your face, eyelids, mouth, throat or tongue  · Trouble urinating due to swelling in the genital area  · Fever of 100.4°F (38°C) or higher  · Redness or swelling that gets worse  · Pain that gets worse  · Foul-smelling fluid leaking from the skin  · Yellow-brown crusts on the open blisters  Date Last Reviewed: 9/1/2016  © 6858-1820 [employer]. [address] [place] [address], [place] [address]. All rights reserved. This information is not intended as a substitute for professional medical care. Always follow your healthcare professional's instructions.

## 2018-07-09 ENCOUNTER — HOSPITAL ENCOUNTER (OUTPATIENT)
Dept: RADIOLOGY | Facility: HOSPITAL | Age: 48
Discharge: HOME | End: 2018-07-09
Attending: INTERNAL MEDICINE
Payer: MEDICAID

## 2018-07-09 VITALS — WEIGHT: 166 LBS | BODY MASS INDEX: 29.41 KG/M2 | HEIGHT: 63 IN

## 2018-07-09 DIAGNOSIS — Z12.31 ENCOUNTER FOR SCREENING MAMMOGRAM FOR BREAST CANCER: ICD-10-CM

## 2018-07-09 PROCEDURE — 77067 SCR MAMMO BI INCL CAD: CPT | Mod: 26,,, | Performed by: RADIOLOGY

## 2018-07-09 PROCEDURE — 77063 BREAST TOMOSYNTHESIS BI: CPT | Mod: TC

## 2018-07-09 PROCEDURE — 77063 BREAST TOMOSYNTHESIS BI: CPT | Mod: 26,,, | Performed by: RADIOLOGY

## 2018-07-10 DIAGNOSIS — G89.29 CHRONIC BILATERAL LOW BACK PAIN WITHOUT SCIATICA: ICD-10-CM

## 2018-07-10 DIAGNOSIS — M54.50 CHRONIC BILATERAL LOW BACK PAIN WITHOUT SCIATICA: ICD-10-CM

## 2018-07-10 RX ORDER — HYDROCODONE BITARTRATE AND ACETAMINOPHEN 5; 325 MG/1; MG/1
1 TABLET ORAL EVERY 12 HOURS PRN
Qty: 60 TABLET | Refills: 0 | Status: ON HOLD | OUTPATIENT
Start: 2018-07-10 | End: 2019-03-13

## 2018-07-10 NOTE — TELEPHONE ENCOUNTER
Patient requesting a 30-day medication refill on HYDROcodone-acetaminophen (NORCO) 5-325 mg per tablet sent to [facility]. Last office visit on 02/19/2018. Please advise.

## 2018-07-10 NOTE — TELEPHONE ENCOUNTER
Refilled. Has appt in September and lab orders added today. Please inform patient she will need her yearly fasting labs prior to appointment. Thanks

## 2018-07-20 ENCOUNTER — OFFICE VISIT (OUTPATIENT)
Dept: OBSTETRICS AND GYNECOLOGY | Facility: CLINIC | Age: 48
End: 2018-07-20
Payer: MEDICAID

## 2018-07-20 VITALS
BODY MASS INDEX: 30.23 KG/M2 | SYSTOLIC BLOOD PRESSURE: 126 MMHG | WEIGHT: 170.63 LBS | HEART RATE: 77 BPM | RESPIRATION RATE: 13 BRPM | HEIGHT: 63 IN | DIASTOLIC BLOOD PRESSURE: 84 MMHG

## 2018-07-20 DIAGNOSIS — Z71.3 WEIGHT LOSS COUNSELING, ENCOUNTER FOR: ICD-10-CM

## 2018-07-20 DIAGNOSIS — Z01.419 WELL WOMAN EXAM WITH ROUTINE GYNECOLOGICAL EXAM: Primary | ICD-10-CM

## 2018-07-20 DIAGNOSIS — N95.1 PERIMENOPAUSAL VASOMOTOR SYMPTOMS: ICD-10-CM

## 2018-07-20 DIAGNOSIS — Z90.710 HISTORY OF HYSTERECTOMY FOR BENIGN DISEASE: ICD-10-CM

## 2018-07-20 PROCEDURE — 99999 PR PBB SHADOW E&M-EST. PATIENT-LVL III: CPT | Mod: PBBFAC,,, | Performed by: OBSTETRICS & GYNECOLOGY

## 2018-07-20 PROCEDURE — 99396 PREV VISIT EST AGE 40-64: CPT | Mod: S$PBB,,, | Performed by: OBSTETRICS & GYNECOLOGY

## 2018-07-20 PROCEDURE — 99213 OFFICE O/P EST LOW 20 MIN: CPT | Mod: PBBFAC | Performed by: OBSTETRICS & GYNECOLOGY

## 2018-07-20 NOTE — PROGRESS NOTES
Subjective:    Patient ID: Sammi Pope is a 48 y.o. y.o. female.     Chief Complaint: Annual Well Woman Exam     History of Present Illness:  Sammi presents today for Annual Well Woman exam. She describes her menses as absent, h/o hysterectomy for benign disease.She denies pelvic pain.  She denies breast tenderness, masses, nipple discharge. She denies difficulty with urination or bowel movements. She admits to occasional menopausal symptoms such as hotflashes and night sweats. She denies bloating, early satiety, or weight changes. She is sexually active.       Menstrual History:   No LMP recorded. Patient has had a hysterectomy..     OB History      Para Term  AB Living    4 3 3   1      SAB TAB Ectopic Multiple Live Births    1                  The following portions of the patient's history were reviewed and updated as appropriate: allergies, current medications, past family history, past medical history, past social history, past surgical history and problem list.    ROS:   CONSTITUTIONAL: weight gain, Denies: fever, chills, diaphoresis, weakness, fatigue, weight loss  ENT: negative for sore throat, nasal congestion, nasal discharge, epistaxis, tinnitus, hearing loss  EYES: negative for blurry vision, decreased vision, loss of vision, eye pain, diplopia, photophobia, discharge  SKIN: Negative for rash, itching, hives  RESPIRATORY: negative for cough, hemoptysis, shortness of breath, pleuritic chest pain, wheezing  CARDIOVASCULAR: negative for chest pain, dyspnea on exertion, orthopnea, paroxysmal nocturnal dyspnea, edema, palpitations  BREAST: negative for breast  tenderness, breast mass, nipple discharge, or skin changes  GASTROINTESTINAL: negative for abdominal pain, flank pain, nausea, vomiting, diarrhea, constipation, black stool, blood in stool  GENITOURINARY: negative for abnormal vaginal bleeding, amenorrhea, decreased libido, dysuria, genital sores, hematuria, incontinence,  menorrhagia, pelvic pain, urinary frequency, vaginal discharge  HEMATOLOGIC/LYMPHATIC: negative for swollen lymph nodes, bleeding, bruising  MUSCULOSKELETAL: back pain, muscle pain, Denies: joint pain, joint swelling, muscle weakness  NEUROLOGICAL: negative for dizzy/vertigo, headache, focal weakness, numbness/tingling, speech problems, loss of consciousness, confusion, memory loss  BEHAVORIAL/PSYCH: negative for anxiety, depression, psychosis  ENDOCRINE: negative for polydipsia/polyuria, palpitations, skin changes, temperature intolerance, unexpected weight changes    Objective:    Vital Signs:  Vitals:    07/20/18 1402   BP: 126/84   Pulse: 77   Resp: 13       Physical Exam:  General:  alert, cooperative, no distress   Skin:  Skin color, texture, turgor normal. No rashes or lesions   HEENT:  extra ocular movement intact, sclera clear, anicteric   Neck: supple, trachea midline, no adenopathy or thyromegally   Respiratory:  Normal effort   Breasts:  no discharge, erythema, tenderness, or palpable masses; no axillary lymphadenopathy   Abdomen:  soft, nontender, no palpable masses   Pelvis: External genitalia: normal general appearance  Urinary system: urethral meatus normal, bladder nontender  Vaginal: normal mucosa without prolapse or lesions  Cervix: removed surgically  Uterus: removed surgically  Adnexa: normal size, nontender bilaterally   Extremities: Normal ROM; no edema, no cyanosis   Neurologial: Normal strength and tone. No focal numbness or weakness.   Psychiatric: normal mood, speech, dress, and thought processes         Assessment:       Healthy female exam.     1. Well woman exam with routine gynecological exam    2. History of hysterectomy for benign disease    3. Perimenopausal vasomotor symptoms    4. Weight loss counseling, encounter for          Plan:      Well woman exam with routine gynecological exam    History of hysterectomy for benign disease    Perimenopausal vasomotor symptoms    Weight loss  counseling, encounter for      GYN examination benign today. Pap no longer indicated after hysterectomy.    Up to date with [facility].   Discussed consideration of HRT for help with perimenopausal symptoms.  We discussed risks and benefits of HRT.  Pt declines at this time as symptoms are not bad enough yet per pt.   Discussed weight loss goals.  Pt unable to exercise 2/2 back and knee issues.  Discussed different diet options.  Try to avoid fad diets.  Focus on healthy portion control diet options.      COUNSELING:  Sammi was counseled on [facility] Pap guidelines and recommendations for yearly pelvic exams in addition to recommendations for monthly self breast exams; to see her PCP for other health maintenance.

## 2018-08-08 DIAGNOSIS — M54.50 CHRONIC BILATERAL LOW BACK PAIN WITHOUT SCIATICA: ICD-10-CM

## 2018-08-08 DIAGNOSIS — G89.29 CHRONIC BILATERAL LOW BACK PAIN WITHOUT SCIATICA: ICD-10-CM

## 2018-08-08 RX ORDER — BACLOFEN 20 MG/1
20 TABLET ORAL 3 TIMES DAILY
Qty: 90 TABLET | Refills: 11 | Status: ON HOLD | OUTPATIENT
Start: 2018-08-08 | End: 2019-03-13

## 2019-03-13 PROBLEM — I21.4 NON-STEMI (NON-ST ELEVATED MYOCARDIAL INFARCTION): Status: ACTIVE | Noted: 2019-03-13

## 2019-03-13 PROBLEM — R07.9 CHEST PAIN: Status: ACTIVE | Noted: 2019-03-13

## 2019-03-13 PROBLEM — I25.110 CORONARY ARTERY DISEASE INVOLVING NATIVE CORONARY ARTERY OF NATIVE HEART WITH UNSTABLE ANGINA PECTORIS: Status: ACTIVE | Noted: 2019-03-13

## 2019-03-13 PROBLEM — I21.4 NSTEMI (NON-ST ELEVATED MYOCARDIAL INFARCTION): Status: ACTIVE | Noted: 2019-03-13

## 2019-06-24 PROBLEM — I20.9 ANGINA, CLASS III: Status: ACTIVE | Noted: 2019-06-24

## 2019-07-18 ENCOUNTER — OFFICE VISIT (OUTPATIENT)
Dept: URGENT CARE | Facility: CLINIC | Age: 49
End: 2019-07-18
Payer: MEDICAID

## 2019-07-18 VITALS
DIASTOLIC BLOOD PRESSURE: 80 MMHG | TEMPERATURE: 97 F | OXYGEN SATURATION: 100 % | WEIGHT: 155 LBS | SYSTOLIC BLOOD PRESSURE: 116 MMHG | HEART RATE: 101 BPM | HEIGHT: 63 IN | BODY MASS INDEX: 27.46 KG/M2 | RESPIRATION RATE: 17 BRPM

## 2019-07-18 DIAGNOSIS — R30.0 DYSURIA: ICD-10-CM

## 2019-07-18 DIAGNOSIS — J03.90 TONSILLITIS: Primary | ICD-10-CM

## 2019-07-18 DIAGNOSIS — Z72.0 TOBACCO ABUSE: ICD-10-CM

## 2019-07-18 DIAGNOSIS — N30.01 ACUTE CYSTITIS WITH HEMATURIA: ICD-10-CM

## 2019-07-18 LAB
BILIRUB UR QL STRIP: NEGATIVE
CTP QC/QA: YES
GLUCOSE UR QL STRIP: NEGATIVE
KETONES UR QL STRIP: NEGATIVE
LEUKOCYTE ESTERASE UR QL STRIP: POSITIVE
PH, POC UA: 6 (ref 5–8)
POC BLOOD, URINE: POSITIVE
POC NITRATES, URINE: NEGATIVE
PROT UR QL STRIP: POSITIVE
S PYO RRNA THROAT QL PROBE: NEGATIVE
SP GR UR STRIP: 1.02 (ref 1–1.03)
UROBILINOGEN UR STRIP-ACNC: NORMAL (ref 0.1–1.1)

## 2019-07-18 PROCEDURE — 87880 POCT RAPID STREP A: ICD-10-PCS | Mod: QW,S$GLB,, | Performed by: NURSE PRACTITIONER

## 2019-07-18 PROCEDURE — 81003 POCT URINALYSIS, DIPSTICK, AUTOMATED, W/O SCOPE: ICD-10-PCS | Mod: QW,S$GLB,, | Performed by: NURSE PRACTITIONER

## 2019-07-18 PROCEDURE — 81003 URINALYSIS AUTO W/O SCOPE: CPT | Mod: QW,S$GLB,, | Performed by: NURSE PRACTITIONER

## 2019-07-18 PROCEDURE — 99214 PR OFFICE/OUTPT VISIT, EST, LEVL IV, 30-39 MIN: ICD-10-PCS | Mod: S$GLB,,, | Performed by: NURSE PRACTITIONER

## 2019-07-18 PROCEDURE — 87880 STREP A ASSAY W/OPTIC: CPT | Mod: QW,S$GLB,, | Performed by: NURSE PRACTITIONER

## 2019-07-18 PROCEDURE — 99214 OFFICE O/P EST MOD 30 MIN: CPT | Mod: S$GLB,,, | Performed by: NURSE PRACTITIONER

## 2019-07-18 RX ORDER — CEFTRIAXONE 1 G/1
1 INJECTION, POWDER, FOR SOLUTION INTRAMUSCULAR; INTRAVENOUS
Status: COMPLETED | OUTPATIENT
Start: 2019-07-18 | End: 2019-07-18

## 2019-07-18 RX ORDER — AMOXICILLIN 875 MG/1
875 TABLET, FILM COATED ORAL EVERY 12 HOURS
Qty: 20 TABLET | Refills: 0 | Status: SHIPPED | OUTPATIENT
Start: 2019-07-18 | End: 2019-07-28

## 2019-07-18 RX ADMIN — CEFTRIAXONE 1 G: 1 INJECTION, POWDER, FOR SOLUTION INTRAMUSCULAR; INTRAVENOUS at 09:07

## 2019-07-18 NOTE — PATIENT INSTRUCTIONS
Please drink plenty of fluids.  Please get plenty of rest.    Please return here or go to the Emergency Department for any concerns or worsening of condition.    Good handwashing. No sharing food or drinks. Cover mouth with kleenex when cough or sneeze and immediately throw then wash hands. Wash bed sheets daily for next 3 days. New toothbrush as discussed. If anyone else in household with toothbrushes all in same place, these should be discarded as well.         Take tylenol (acetominophen) for fever, chills or body aches every 4-6 hours- do not exceed 4000 mg/ day.      Use an antihistmine such as claritin or zyrtec to dry you out. .  Coricidrin HBP is an option for cold symptoms if you have high blood pressure.        Pharyngitis: Strep (Presumed)    You have pharyngitis (sore throat). The cause is thought to be the streptococcus, or strep, bacterium. Strep throat infection can cause throat pain that is worse when swallowing, aching all over, headache, and fever. The infection may be spread by coughing, kissing, or touching others after touching your mouth or nose. Antibiotic medications are given to treat the infection.  Home care  Rest at home. Drink plenty of fluids to avoid dehydration.  No work or school for the first 2 days of taking the antibiotics. After this time, you will not be contagious. You can then return to work or school if you are feeling better.   The antibiotic medication must be taken for the full 10 days, even if you feel better. This is very important to ensure the infection is treated. It is also important to prevent drug-resistant organisms from developing. If you were given an antibiotic shot, no more antibiotics are needed.  You may use acetaminophen or ibuprofen to control pain or fever, unless another medicine was prescribed for this. If you have chronic liver or kidney disease or ever had a stomach ulcer or GI bleeding, talk with your doctor before using these  "medicines.  Throat lozenges or a throat-numbing sprays can help reduce throat pain. Gargling with warm salt water can also help. Dissolve 1/2 teaspoon of salt in 1 8 ounce glass of warm water.   Avoid salty or spicy foods, which can irritate the throat.  Follow-up care  Follow up with your healthcare provider or our staff if you are not improving over the next week.  When to seek medical advice  Call your healthcare provider right away if any of these occur:  Fever as directed by your doctor.   New or worsening ear pain, sinus pain, or headache  Painful lumps in the back of neck  Stiff neck  Lymph nodes are getting larger  Inability to swallow liquids, excessive drooling, or inability to open mouth wide due to throat pain  Signs of dehydration (very dark urine or no urine, sunken eyes, dizziness)  Trouble breathing or noisy breathing  Muffled voice  New rash  Date Last Reviewed: 4/13/2015  © 5485-7281 [employer]. [address] [place] [address]. All rights reserved. This information is not intended as a substitute for professional medical care. Always follow your healthcare professional's instructions.          *Urinary Tract Infections*  1) Avoid tub baths.  2) Always urinate after intercourse(Teens/Adults).  3) Avoid "Fizzy" drinks/soda drinks.  4) Always wipe from front to back.  5) Wear only cotton underwear.  6) Drink a lot of fluids (at least 8-10 glasses of water) for 5 to 7 days to help flush your kidneys. You can also drink 1 shot-sized glass of cranberry juice 3X daily over the next several days to help cleanse your bladder, but studies show that cranberry juice does not cure or prevent a UTI.   7) Take all medications as directed. Make sure to complete all antibiotics as prescribed- you should never have left over antibiotics unless otherwise directed to stop.   8) If you were prescribed pyridium and you wear contacts, the medication may cause staining of your contacts.   9) For " patients above 6 months of age who are not allergic to and are not on anticoagulants, you can alternate Tylenol and Motrin every 4-6 hours for fever above 100.4F and/or pain.  For patients less than 6 months of age, allergic to or intolerant to NSAIDS, have gastritis, gastric ulcers, or history of GI bleeds, are pregnant, or are on anticoagulant therapy, you can take Tylenol every 4 hours as needed for fever above 100.4F and/or pain.   10) You should schedule a follow-up appointment with your Primary Care Provider/Pediatrician for recheck in 2-3 days or as directed at this visit.   11) If your condition fails to improve in a timely manner, you should receive another evaluation by your Primary Care Provider/Pediatrician to discuss your concerns or return to urgent care for a recheck.  If your condition worsens at any time, you should report immediately to your nearest Emergency Department for further evaluation. If this is recurrent, you may need to see a urologist. **You must understand that you have received Urgent Care treatment only and that you may be released before all of your medical problems are known or treated. You, the patient, are responsible to arrange for follow-up care as instructed.       ·   ·   ·   · Follow up with your primary care in 2-5 days if symptoms have not improved, or you may return here.  · If you were referred to a specialist, please follow up with that specialty.  · If you were prescribed antibiotics, please take them to completion.  · If you were prescribed a narcotic or any medication with sedative effects, do not drive or operate heavy equipment or machinery while taking these medications.  · You must understand that you have received treatment at an Urgent Care facility only, and that you may be released before all of your medical problems are known or treated. Urgent Care facilities are not equipped to handle life threatening emergencies. It is recommended that you go to an  Emergency Department for further evaluation of worsening or concerning symptoms, or possibly life threatening conditions as discussed.                                        If you  smoke, please stop smoking

## 2019-07-18 NOTE — PROGRESS NOTES
"Subjective:       Patient ID: Sammi Pope is a 49 y.o. female.    Vitals:  height is 5' 3" (1.6 m) and weight is 70.3 kg (155 lb). Her temperature is 97.2 °F (36.2 °C). Her blood pressure is 116/80 and her pulse is 101. Her respiration is 17 and oxygen saturation is 100%.     Chief Complaint: Dysuria    Pt states she is very sensitive to bath cleansers, cannot use herself. She thought she had a uti, saw pcp Monday for dysuria, treated with 3 days of cipro, resolved but after s.o. Returned yest and had intercourse again she is concerned she is having another uti or a reaction to his soap he uses. No fever, chills, n/v, abdominal/pelvic/back pain, flank pain. She has noticed a little blood with wiping and burns with wiping and urination.  Denies sti concern.    Dysuria    This is a new problem. Episode onset: 1 1/2 week. The problem occurs every urination. The problem has been gradually worsening. The quality of the pain is described as burning. The pain is moderate. There has been no fever. There is no history of pyelonephritis. Associated symptoms include urgency. Pertinent negatives include no chills, discharge, flank pain, frequency, hematuria, hesitancy, nausea, vomiting, constipation or rash. She has tried NSAIDs, antibiotics and acetaminophen for the symptoms. The treatment provided no relief. There is no history of diabetes mellitus, recurrent UTIs or STD.   Sore Throat    Associated symptoms include congestion, coughing (x1day) and swollen glands. Pertinent negatives include no abdominal pain, diarrhea, drooling, ear discharge, ear pain, headaches, hoarse voice, plugged ear sensation, neck pain, shortness of breath, trouble swallowing or vomiting. She has had no exposure to strep or mono. She has tried nothing for the symptoms.       Constitution: Negative for chills, fever and generalized weakness.   HENT: Positive for congestion, postnasal drip, sinus pressure and sore throat (x4 days). Negative for " ear pain, ear discharge, drooling, trouble swallowing and voice change.         Ear fullness   Neck: Positive for painful lymph nodes. Negative for neck pain and neck stiffness.   Cardiovascular: Negative for chest pain and leg swelling.   Eyes: Negative for eye discharge and eye redness.   Respiratory: Positive for cough (x1day). Negative for shortness of breath.    Gastrointestinal: Negative for abdominal pain, nausea, vomiting, constipation and diarrhea.   Genitourinary: Positive for dysuria and urgency. Negative for frequency, urine decreased, flank pain, hematuria, history of kidney stones, painful menstruation, irregular menstruation, missed menses, heavy menstrual bleeding, ovarian cysts, genital trauma, vaginal pain, vaginal discharge, vaginal bleeding, vaginal odor, painful intercourse, genital sore, painful ejaculation and pelvic pain.   Musculoskeletal: Negative for joint pain, joint swelling and back pain.   Skin: Negative for rash and lesion.   Allergic/Immunologic: Positive for environmental allergies and seasonal allergies.   Neurological: Negative for headaches.   Hematologic/Lymphatic: Positive for swollen lymph nodes.       Objective:      Physical Exam   Constitutional: She is oriented to person, place, and time. She appears well-developed and well-nourished. She is cooperative.  Non-toxic appearance. She does not have a sickly appearance. She does not appear ill. No distress.   HENT:   Head: Normocephalic and atraumatic.   Right Ear: Hearing, tympanic membrane, external ear and ear canal normal. No mastoid tenderness. Tympanic membrane is not erythematous.   Left Ear: Hearing, tympanic membrane, external ear and ear canal normal. No mastoid tenderness. Tympanic membrane is not erythematous.   Nose: Nose normal. No mucosal edema, rhinorrhea or nasal deformity. No epistaxis. Right sinus exhibits no maxillary sinus tenderness and no frontal sinus tenderness. Left sinus exhibits no maxillary sinus  tenderness and no frontal sinus tenderness.   Mouth/Throat: Uvula is midline and mucous membranes are normal. No trismus in the jaw. Normal dentition. No uvula swelling. Posterior oropharyngeal erythema present. Tonsils are 1+ on the right. Tonsils are 1+ on the left. Tonsillar exudate.   Normal phonation. Symmetrical soft palate elevation. No drooling or trismus.   Eyes: Conjunctivae and lids are normal. Right eye exhibits no discharge. Left eye exhibits no discharge. No scleral icterus.   Sclera clear bilat   Neck: Trachea normal, normal range of motion, full passive range of motion without pain and phonation normal. Neck supple. No tracheal deviation present.   Cardiovascular: Normal rate, regular rhythm, normal heart sounds, intact distal pulses and normal pulses.   No murmur heard.  Pulmonary/Chest: Effort normal and breath sounds normal. No respiratory distress. She has no wheezes.   Abdominal: Soft. Normal appearance and bowel sounds are normal. She exhibits no distension, no pulsatile midline mass and no mass. There is no tenderness. There is no rebound, no guarding and no CVA tenderness. No hernia.   Musculoskeletal: Normal range of motion. She exhibits no edema or deformity.   Lymphadenopathy:     She has cervical adenopathy (bilat, mobile and tender).   Neurological: She is alert and oriented to person, place, and time. She exhibits normal muscle tone. Coordination normal.   Skin: Skin is warm, dry and intact. Capillary refill takes less than 2 seconds. She is not diaphoretic. No pallor.   Psychiatric: She has a normal mood and affect. Her speech is normal and behavior is normal. Judgment and thought content normal. Cognition and memory are normal.   Nursing note and vitals reviewed.      Assessment:       1. Tonsillitis    2. Dysuria    3. Acute cystitis with hematuria    4. Tobacco abuse        Plan:     Abdomen exam benign. Pt with ua as noted, had completed 3 day cipro 250mg course. Pt also with  evidence tonsillitis, no evidence peritonsillar abscess. Alert, nontoxic and in NAD. Afebrile.     Tonsillitis  -     POCT rapid strep A  -     amoxicillin (AMOXIL) 875 MG tablet; Take 1 tablet (875 mg total) by mouth every 12 (twelve) hours. for 10 days  Dispense: 20 tablet; Refill: 0    Dysuria  -     POCT Urinalysis, Dipstick, Automated, W/O Scope  -     Culture, Urine    Acute cystitis with hematuria  -     cefTRIAXone injection 1 g    Tobacco abuse  -     Ambulatory referral to Smoking Cessation Program          Office Visit on 07/18/2019   Component Date Value Ref Range Status    POC Blood, Urine 07/18/2019 Positive* Negative Final    50    POC Bilirubin, Urine 07/18/2019 Negative  Negative Final    POC Urobilinogen, Urine 07/18/2019 normal  0.1 - 1.1 Final    POC Ketones, Urine 07/18/2019 Negative  Negative Final    POC Protein, Urine 07/18/2019 Positive* Negative Final    10    POC Nitrates, Urine 07/18/2019 Negative  Negative Final    POC Glucose, Urine 07/18/2019 Negative  Negative Final    pH, UA 07/18/2019 6.0  5 - 8 Final    POC Specific Gravity, Urine 07/18/2019 1.020  1.003 - 1.029 Final    POC Leukocytes, Urine 07/18/2019 Positive* Negative Final    75    Rapid Strep A Screen 07/18/2019 Negative  Negative Final     Acceptable 07/18/2019 Yes   Final     Office Visit on 07/18/2019   Component Date Value Ref Range Status    POC Blood, Urine 07/18/2019 Positive* Negative Final    50    POC Bilirubin, Urine 07/18/2019 Negative  Negative Final    POC Urobilinogen, Urine 07/18/2019 normal  0.1 - 1.1 Final    POC Ketones, Urine 07/18/2019 Negative  Negative Final    POC Protein, Urine 07/18/2019 Positive* Negative Final    10    POC Nitrates, Urine 07/18/2019 Negative  Negative Final    POC Glucose, Urine 07/18/2019 Negative  Negative Final    pH, UA 07/18/2019 6.0  5 - 8 Final    POC Specific Gravity, Urine 07/18/2019 1.020  1.003 - 1.029 Final    POC Leukocytes, Urine  07/18/2019 Positive* Negative Final    75    Rapid Strep A Screen 07/18/2019 Negative  Negative Final     Acceptable 07/18/2019 Yes   Final         Patient Instructions         Please drink plenty of fluids.  Please get plenty of rest.    Please return here or go to the Emergency Department for any concerns or worsening of condition.    Good handwashing. No sharing food or drinks. Cover mouth with kleenex when cough or sneeze and immediately throw then wash hands. Wash bed sheets daily for next 3 days. New toothbrush as discussed. If anyone else in household with toothbrushes all in same place, these should be discarded as well.         Take tylenol (acetominophen) for fever, chills or body aches every 4-6 hours- do not exceed 4000 mg/ day.      Use an antihistmine such as claritin or zyrtec to dry you out. .  Coricidrin HBP is an option for cold symptoms if you have high blood pressure.        Pharyngitis: Strep (Presumed)    You have pharyngitis (sore throat). The cause is thought to be the streptococcus, or strep, bacterium. Strep throat infection can cause throat pain that is worse when swallowing, aching all over, headache, and fever. The infection may be spread by coughing, kissing, or touching others after touching your mouth or nose. Antibiotic medications are given to treat the infection.  Home care  Rest at home. Drink plenty of fluids to avoid dehydration.  No work or school for the first 2 days of taking the antibiotics. After this time, you will not be contagious. You can then return to work or school if you are feeling better.   The antibiotic medication must be taken for the full 10 days, even if you feel better. This is very important to ensure the infection is treated. It is also important to prevent drug-resistant organisms from developing. If you were given an antibiotic shot, no more antibiotics are needed.  You may use acetaminophen or ibuprofen to control pain or fever, unless  "another medicine was prescribed for this. If you have chronic liver or kidney disease or ever had a stomach ulcer or GI bleeding, talk with your doctor before using these medicines.  Throat lozenges or a throat-numbing sprays can help reduce throat pain. Gargling with warm salt water can also help. Dissolve 1/2 teaspoon of salt in 1 8 ounce glass of warm water.   Avoid salty or spicy foods, which can irritate the throat.  Follow-up care  Follow up with your healthcare provider or our staff if you are not improving over the next week.  When to seek medical advice  Call your healthcare provider right away if any of these occur:  Fever as directed by your doctor.   New or worsening ear pain, sinus pain, or headache  Painful lumps in the back of neck  Stiff neck  Lymph nodes are getting larger  Inability to swallow liquids, excessive drooling, or inability to open mouth wide due to throat pain  Signs of dehydration (very dark urine or no urine, sunken eyes, dizziness)  Trouble breathing or noisy breathing  Muffled voice  New rash  Date Last Reviewed: 4/13/2015  © 7877-5908 "[employer]". [address] [place] [address]. All rights reserved. This information is not intended as a substitute for professional medical care. Always follow your healthcare professional's instructions.          *Urinary Tract Infections*  1) Avoid tub baths.  2) Always urinate after intercourse(Teens/Adults).  3) Avoid "Fizzy" drinks/soda drinks.  4) Always wipe from front to back.  5) Wear only cotton underwear.  6) Drink a lot of fluids (at least 8-10 glasses of water) for 5 to 7 days to help flush your kidneys. You can also drink 1 shot-sized glass of cranberry juice 3X daily over the next several days to help cleanse your bladder, but studies show that cranberry juice does not cure or prevent a UTI.   7) Take all medications as directed. Make sure to complete all antibiotics as prescribed- you should never have left " over antibiotics unless otherwise directed to stop.   8) If you were prescribed pyridium and you wear contacts, the medication may cause staining of your contacts.   9) For patients above 6 months of age who are not allergic to and are not on anticoagulants, you can alternate Tylenol and Motrin every 4-6 hours for fever above 100.4F and/or pain.  For patients less than 6 months of age, allergic to or intolerant to NSAIDS, have gastritis, gastric ulcers, or history of GI bleeds, are pregnant, or are on anticoagulant therapy, you can take Tylenol every 4 hours as needed for fever above 100.4F and/or pain.   10) You should schedule a follow-up appointment with your Primary Care Provider/Pediatrician for recheck in 2-3 days or as directed at this visit.   11) If your condition fails to improve in a timely manner, you should receive another evaluation by your Primary Care Provider/Pediatrician to discuss your concerns or return to urgent care for a recheck.  If your condition worsens at any time, you should report immediately to your nearest Emergency Department for further evaluation. If this is recurrent, you may need to see a urologist. **You must understand that you have received Urgent Care treatment only and that you may be released before all of your medical problems are known or treated. You, the patient, are responsible to arrange for follow-up care as instructed.       ·   ·   ·   · Follow up with your primary care in 2-5 days if symptoms have not improved, or you may return here.  · If you were referred to a specialist, please follow up with that specialty.  · If you were prescribed antibiotics, please take them to completion.  · If you were prescribed a narcotic or any medication with sedative effects, do not drive or operate heavy equipment or machinery while taking these medications.  · You must understand that you have received treatment at an Urgent Care facility only, and that you may be released before  all of your medical problems are known or treated. Urgent Care facilities are not equipped to handle life threatening emergencies. It is recommended that you go to an Emergency Department for further evaluation of worsening or concerning symptoms, or possibly life threatening conditions as discussed.                                        If you  smoke, please stop smoking

## 2019-07-22 ENCOUNTER — OFFICE VISIT (OUTPATIENT)
Dept: OBSTETRICS AND GYNECOLOGY | Facility: CLINIC | Age: 49
End: 2019-07-22
Payer: MEDICAID

## 2019-07-22 ENCOUNTER — TELEPHONE (OUTPATIENT)
Dept: URGENT CARE | Facility: CLINIC | Age: 49
End: 2019-07-22

## 2019-07-22 VITALS
HEART RATE: 84 BPM | SYSTOLIC BLOOD PRESSURE: 120 MMHG | BODY MASS INDEX: 26.4 KG/M2 | RESPIRATION RATE: 13 BRPM | DIASTOLIC BLOOD PRESSURE: 76 MMHG | WEIGHT: 149 LBS | HEIGHT: 63 IN

## 2019-07-22 DIAGNOSIS — N93.9 VAGINAL BLEEDING: ICD-10-CM

## 2019-07-22 DIAGNOSIS — R10.2 VAGINAL PAIN: ICD-10-CM

## 2019-07-22 DIAGNOSIS — Z01.419 WELL WOMAN EXAM WITH ROUTINE GYNECOLOGICAL EXAM: Primary | ICD-10-CM

## 2019-07-22 DIAGNOSIS — Z90.710 HISTORY OF HYSTERECTOMY FOR BENIGN DISEASE: ICD-10-CM

## 2019-07-22 DIAGNOSIS — Z12.31 ENCOUNTER FOR SCREENING MAMMOGRAM FOR BREAST CANCER: ICD-10-CM

## 2019-07-22 LAB
BACTERIA UR CULT: NO GROWTH
BACTERIA UR CULT: NORMAL

## 2019-07-22 PROCEDURE — 99396 PR PREVENTIVE VISIT,EST,40-64: ICD-10-PCS | Mod: S$PBB,,, | Performed by: OBSTETRICS & GYNECOLOGY

## 2019-07-22 PROCEDURE — 87801 DETECT AGNT MULT DNA AMPLI: CPT

## 2019-07-22 PROCEDURE — 99213 OFFICE O/P EST LOW 20 MIN: CPT | Mod: PBBFAC | Performed by: OBSTETRICS & GYNECOLOGY

## 2019-07-22 PROCEDURE — 99999 PR PBB SHADOW E&M-EST. PATIENT-LVL III: ICD-10-PCS | Mod: PBBFAC,,, | Performed by: OBSTETRICS & GYNECOLOGY

## 2019-07-22 PROCEDURE — 99396 PREV VISIT EST AGE 40-64: CPT | Mod: S$PBB,,, | Performed by: OBSTETRICS & GYNECOLOGY

## 2019-07-22 PROCEDURE — 99999 PR PBB SHADOW E&M-EST. PATIENT-LVL III: CPT | Mod: PBBFAC,,, | Performed by: OBSTETRICS & GYNECOLOGY

## 2019-07-22 PROCEDURE — 87481 CANDIDA DNA AMP PROBE: CPT | Mod: 59

## 2019-07-22 PROCEDURE — 87661 TRICHOMONAS VAGINALIS AMPLIF: CPT

## 2019-07-22 RX ORDER — DIPHENHYDRAMINE HCL 25 MG
25 TABLET ORAL NIGHTLY PRN
COMMUNITY
End: 2022-01-05

## 2019-07-22 RX ORDER — NITROGLYCERIN 0.4 MG/1
0.4 TABLET SUBLINGUAL EVERY 5 MIN PRN
COMMUNITY

## 2019-07-22 RX ORDER — METRONIDAZOLE 500 MG/1
500 TABLET ORAL EVERY 12 HOURS
Qty: 14 TABLET | Refills: 0 | Status: ON HOLD | OUTPATIENT
Start: 2019-07-22 | End: 2019-07-30 | Stop reason: HOSPADM

## 2019-07-22 RX ORDER — HYDROXYZINE HYDROCHLORIDE 25 MG/1
25 TABLET, FILM COATED ORAL 3 TIMES DAILY
COMMUNITY
End: 2019-08-28

## 2019-07-22 RX ORDER — FLUCONAZOLE 150 MG/1
150 TABLET ORAL DAILY
Qty: 1 TABLET | Refills: 0 | Status: SHIPPED | OUTPATIENT
Start: 2019-07-22 | End: 2019-07-23

## 2019-07-22 RX ORDER — PANTOPRAZOLE SODIUM 40 MG/1
40 TABLET, DELAYED RELEASE ORAL DAILY
COMMUNITY

## 2019-07-22 NOTE — TELEPHONE ENCOUNTER
Called regarding lab results from [facility] 07/18.  Patient informed of results.  Patient asked if was a UTI, stated there was no bacteria in urine culture.  Patient stated still feeling bad, but has a GYN appointment today at 2:30.

## 2019-07-22 NOTE — PROGRESS NOTES
Subjective:    Patient ID: Sammi Pope is a 49 y.o. y.o. female.     Chief Complaint: Annual Well Woman Exam     History of Present Illness:  Sammi presents today for Annual Well Woman exam. She describes her menses as absent, h/o hysterectomy.She denies pelvic pain.   She is having vaginal pain after intercourse.  She was concerned that she had a bladder infection, but was seen at an urgent care that told her no UTI present.  She denies breast tenderness, masses, nipple discharge. She denies menopausal symptoms such as hotflashes, vaginal dryness, and night sweats. She denies bloating, early satiety, or weight changes. She is sexually active.       Menstrual History:   No LMP recorded. Patient has had a hysterectomy..     OB History    :  4  Para:  3  Term:  3  :  1  Spontaneous :  1            The following portions of the patient's history were reviewed and updated as appropriate: allergies, current medications, past family history, past medical history, past social history, past surgical history and problem list.    ROS:   CONSTITUTIONAL: fatigue, weight loss, Denies: fever, chills, diaphoresis, weakness, weight gain  ENT: negative for sore throat, nasal congestion, nasal discharge, epistaxis, tinnitus, hearing loss  EYES: blurry vision, Denies: eye pain, discharge  SKIN: Negative for rash, itching, hives  RESPIRATORY: negative for cough, hemoptysis, shortness of breath, pleuritic chest pain, wheezing  CARDIOVASCULAR: negative for chest pain, dyspnea on exertion, orthopnea, paroxysmal nocturnal dyspnea, edema, palpitations  BREAST: negative for breast  tenderness, breast mass, nipple discharge, or skin changes  GASTROINTESTINAL: blood in stool with constipation, Denies: abdominal pain, flank pain, nausea, vomiting, diarrhea  GENITOURINARY: negative for abnormal vaginal bleeding, amenorrhea, decreased libido, dysuria, genital sores, hematuria, incontinence, menorrhagia, pelvic  pain, urinary frequency, vaginal discharge  HEMATOLOGIC/LYMPHATIC: negative for swollen lymph nodes, bleeding, bruising  MUSCULOSKELETAL: back pain, Denies: joint pain, joint stiffness, joint swelling, muscle pain, muscle weakness  NEUROLOGICAL: negative for dizzy/vertigo, headache, focal weakness, numbness/tingling, speech problems, loss of consciousness, confusion, memory loss  BEHAVORIAL/PSYCH: negative for anxiety, depression, psychosis  ENDOCRINE: negative for polydipsia/polyuria, palpitations, skin changes, temperature intolerance, unexpected weight changes  ALLERGIC/IMMUNOLOGIC: negative for urticaria, hay fever, angioedema      Objective:    Vital Signs:  Vitals:    07/22/19 1414   BP: 120/76   Pulse: 84   Resp: 13       Physical Exam:  General:  alert, cooperative, no distress   Skin:  Skin color, texture, turgor normal. No rashes or lesions   HEENT:  extra ocular movement intact, sclera clear, anicteric   Neck: supple, trachea midline, no adenopathy or thyromegally   Respiratory:  Normal effort   Breasts:  symmetric fibrous changes in both upper outer quadrants, no discharge, erythema, tenderness, or palpable masses; no axillary lymphadenopathy   Abdomen:  soft, nontender, no palpable masses   Pelvis: External genitalia: normal general appearance; no lesions visualized  Urinary system: urethral meatus swelling present and tender, no bladder tenderness  Vaginal: proximal mucosa tender and limited exam; no lesions visualized  Cervix: removed surgically  Uterus: removed surgically  Adnexa: normal size, nontender bilaterally   Extremities: Normal ROM; no edema, no cyanosis   Neurologial: Normal strength and tone. No focal numbness or weakness.   Psychiatric: normal mood, speech, dress, and thought processes         Assessment:       Healthy female exam.     1. Well woman exam with routine gynecological exam    2. History of hysterectomy for benign disease    3. Vaginal bleeding    4. Vaginal pain    5.  Encounter for screening mammogram for breast cancer          Plan:      Well woman exam with routine gynecological exam    History of hysterectomy for benign disease    Vaginal bleeding  -     Vaginosis Screen by DNA Probe    Vaginal pain  -     Vaginosis Screen by DNA Probe    Encounter for screening mammogram for breast cancer  -     Mammo Digital Screening Bilat w/ Sivakumar; Future; Expected date: 07/22/2019    Other orders  -     metroNIDAZOLE (FLAGYL) 500 MG tablet; Take 1 tablet (500 mg total) by mouth every 12 (twelve) hours. for 7 days  Dispense: 14 tablet; Refill: 0  -     fluconazole (DIFLUCAN) 150 MG Tab; Take 1 tablet (150 mg total) by mouth once daily. for 1 day  Dispense: 1 tablet; Refill: 0          COUNSELING:  Sammi was counseled on [employer].C.O[employer] Pap guidelines and recommendations for yearly pelvic exams in addition to recommendations for monthly self breast exams; to see her PCP for other health maintenance.

## 2019-07-23 ENCOUNTER — HOSPITAL ENCOUNTER (OUTPATIENT)
Dept: RADIOLOGY | Facility: HOSPITAL | Age: 49
Discharge: HOME | End: 2019-07-23
Attending: OBSTETRICS & GYNECOLOGY
Payer: MEDICAID

## 2019-07-23 DIAGNOSIS — Z12.31 ENCOUNTER FOR SCREENING MAMMOGRAM FOR BREAST CANCER: ICD-10-CM

## 2019-07-23 PROCEDURE — 77063 BREAST TOMOSYNTHESIS BI: CPT | Mod: 26,,, | Performed by: RADIOLOGY

## 2019-07-23 PROCEDURE — 77067 SCR MAMMO BI INCL CAD: CPT | Mod: TC

## 2019-07-23 PROCEDURE — 77067 SCR MAMMO BI INCL CAD: CPT | Mod: 26,,, | Performed by: RADIOLOGY

## 2019-07-23 PROCEDURE — 77063 MAMMO DIGITAL SCREENING BILAT WITH TOMOSYNTHESIS_CAD: ICD-10-PCS | Mod: 26,,, | Performed by: RADIOLOGY

## 2019-07-23 PROCEDURE — 77067 MAMMO DIGITAL SCREENING BILAT WITH TOMOSYNTHESIS_CAD: ICD-10-PCS | Mod: 26,,, | Performed by: RADIOLOGY

## 2019-07-24 LAB
BACTERIAL VAGINOSIS DNA: POSITIVE
CANDIDA GLABRATA DNA: NEGATIVE
CANDIDA KRUSEI DNA: NEGATIVE
CANDIDA RRNA VAG QL PROBE: NEGATIVE
T VAGINALIS RRNA GENITAL QL PROBE: NEGATIVE

## 2019-07-30 PROBLEM — I25.10 CORONARY ARTERY DISEASE INVOLVING NATIVE CORONARY ARTERY: Status: ACTIVE | Noted: 2019-03-13

## 2019-08-28 ENCOUNTER — OFFICE VISIT (OUTPATIENT)
Dept: OBSTETRICS AND GYNECOLOGY | Facility: CLINIC | Age: 49
End: 2019-08-28
Payer: MEDICAID

## 2019-08-28 VITALS
DIASTOLIC BLOOD PRESSURE: 76 MMHG | WEIGHT: 148.38 LBS | RESPIRATION RATE: 13 BRPM | HEART RATE: 73 BPM | SYSTOLIC BLOOD PRESSURE: 124 MMHG | BODY MASS INDEX: 26.29 KG/M2 | HEIGHT: 63 IN

## 2019-08-28 DIAGNOSIS — N94.10 DYSPAREUNIA IN FEMALE: Primary | ICD-10-CM

## 2019-08-28 DIAGNOSIS — N89.8 VAGINAL DISCHARGE: ICD-10-CM

## 2019-08-28 PROCEDURE — 99213 OFFICE O/P EST LOW 20 MIN: CPT | Mod: S$PBB,,, | Performed by: OBSTETRICS & GYNECOLOGY

## 2019-08-28 PROCEDURE — 87801 DETECT AGNT MULT DNA AMPLI: CPT

## 2019-08-28 PROCEDURE — 99999 PR PBB SHADOW E&M-EST. PATIENT-LVL III: CPT | Mod: PBBFAC,,, | Performed by: OBSTETRICS & GYNECOLOGY

## 2019-08-28 PROCEDURE — 99213 PR OFFICE/OUTPT VISIT, EST, LEVL III, 20-29 MIN: ICD-10-PCS | Mod: S$PBB,,, | Performed by: OBSTETRICS & GYNECOLOGY

## 2019-08-28 PROCEDURE — 87481 CANDIDA DNA AMP PROBE: CPT | Mod: 59

## 2019-08-28 PROCEDURE — 99999 PR PBB SHADOW E&M-EST. PATIENT-LVL III: ICD-10-PCS | Mod: PBBFAC,,, | Performed by: OBSTETRICS & GYNECOLOGY

## 2019-08-28 PROCEDURE — 99213 OFFICE O/P EST LOW 20 MIN: CPT | Mod: PBBFAC | Performed by: OBSTETRICS & GYNECOLOGY

## 2019-08-28 NOTE — PROGRESS NOTES
Subjective:    Patient ID: Sammi Pope is a 49 y.o. y.o. female.     Chief Complaint:   Chief Complaint   Patient presents with    Vaginal Pain     during intercourse       History of Present Illness   Sammi presents today complaining of pain with intercourse. She reports that pain in only present if she has intercourse more than once in a day.  Pain is not present the first time she has sex.  She will sometimes have some light spotting after intercourse.  When she tries to have sex a second or a third time, this is when the pain occurs.  Pain is usually on the left side of the vagina.  Not positional.    ROS:   CONSTITUTIONAL: Negative for fever, chills, diaphoresis, weakness, fatigue, weight loss, weight gain  GASTROINTESTINAL: negative for abdominal pain, flank pain, nausea, vomiting, diarrhea, constipation, black stool, blood in stool  BREAST: negative for breast  tenderness, breast mass, nipple discharge, or skin changes  GENITOURINARY: per HPI, negative for dysuria, frequency/urgency, hematuria, genital discharge, vaginal bleeding, irregular menses, heavy menses, pelvic pain  HEMATOLOGIC/LYMPHATIC: negative for swollen lymph nodes, bleeding, bruising  MUSCULOSKELETAL: negative for back pain, joint pain, joint stiffness, joint swelling, muscle pain, muscle weakness  ENDOCRINE: negative for polydipsia/polyuria, palpitations, skin changes, temperature intolerance, unexpected weight changes      Objective:    Vital Signs:  Vitals:    08/28/19 1150   BP: 124/76   Pulse: 73   Resp: 13       Physical Exam:  General:  alert, cooperative, no distress   Neuro/Psych: AAOx3, appropriate mood and affect   Head: Normocephalic, atraumatic   Neck: Supple, normal ROM   Resp: Normal effort   Skin:  Skin color, texture, turgor normal. No rashes or lesions   Abdomen:  soft, NT   Pelvis: External genitalia: normal general appearance  Urinary system: urethral meatus normal, bladder nontender  Vaginal: laceration on left  proximal vaginal side wall, healing well; White vaginal discharge  Cervix: removed surgically  Uterus: removed surgically  Adnexa: normal size, nontender bilaterally         Assessment:      1. Dyspareunia in female    2. Vaginal discharge          Plan:      Dyspareunia in female  -     Vaginosis Screen by DNA Probe    Vaginal discharge  -     Vaginosis Screen by DNA Probe    Discussed may need to decrease frequency of intercourse and allow for healing of vaginal laceration.  Increase vaginal lubrication with intercourse.

## 2019-08-30 LAB
BACTERIAL VAGINOSIS DNA: NEGATIVE
CANDIDA GLABRATA DNA: NEGATIVE
CANDIDA KRUSEI DNA: NEGATIVE
CANDIDA RRNA VAG QL PROBE: POSITIVE
T VAGINALIS RRNA GENITAL QL PROBE: NEGATIVE

## 2019-09-03 RX ORDER — FLUCONAZOLE 150 MG/1
150 TABLET ORAL DAILY
Qty: 1 TABLET | Refills: 0 | Status: SHIPPED | OUTPATIENT
Start: 2019-09-03 | End: 2019-09-04

## 2020-01-22 ENCOUNTER — TELEPHONE (OUTPATIENT)
Dept: OBSTETRICS AND GYNECOLOGY | Facility: CLINIC | Age: 50
End: 2020-01-22

## 2020-01-22 NOTE — TELEPHONE ENCOUNTER
----- Message from Imelda Gonzalez sent at 1/22/2020  2:36 PM CST -----  Contact: self      ----- Message -----  From: Nereyda Menchaca MA  Sent: 1/22/2020   2:34 PM CST  To: [facility] Staff    Sammi Pope  MRN: [id number]  Home Phone      [phone]  Work Phone      Not on file.  Mobile          [phone]    Patient Care Team:  Luke Harrison MD as PCP - General (Pediatrics)  Geri Serrano LPN as Care Coordinator  OB? No  What phone number can you be reached at?   [phone]  Message:   Needs to schedule appt for cyst.

## 2020-01-27 ENCOUNTER — OFFICE VISIT (OUTPATIENT)
Dept: OBSTETRICS AND GYNECOLOGY | Facility: CLINIC | Age: 50
End: 2020-01-27
Payer: MEDICAID

## 2020-01-27 VITALS
DIASTOLIC BLOOD PRESSURE: 88 MMHG | HEART RATE: 78 BPM | WEIGHT: 162 LBS | SYSTOLIC BLOOD PRESSURE: 116 MMHG | RESPIRATION RATE: 14 BRPM | BODY MASS INDEX: 28.7 KG/M2 | HEIGHT: 63 IN

## 2020-01-27 DIAGNOSIS — L02.215 PERINEAL ABSCESS: ICD-10-CM

## 2020-01-27 PROCEDURE — 99213 OFFICE O/P EST LOW 20 MIN: CPT | Mod: 25,S$PBB,, | Performed by: OBSTETRICS & GYNECOLOGY

## 2020-01-27 PROCEDURE — 99213 PR OFFICE/OUTPT VISIT, EST, LEVL III, 20-29 MIN: ICD-10-PCS | Mod: 25,S$PBB,, | Performed by: OBSTETRICS & GYNECOLOGY

## 2020-01-27 PROCEDURE — 99999 PR PBB SHADOW E&M-EST. PATIENT-LVL III: CPT | Mod: PBBFAC,,, | Performed by: OBSTETRICS & GYNECOLOGY

## 2020-01-27 PROCEDURE — 56405 I&D VULVA/PERINEAL ABSCESS: CPT | Mod: S$PBB,,, | Performed by: OBSTETRICS & GYNECOLOGY

## 2020-01-27 PROCEDURE — 99213 OFFICE O/P EST LOW 20 MIN: CPT | Mod: PBBFAC,25 | Performed by: OBSTETRICS & GYNECOLOGY

## 2020-01-27 PROCEDURE — 56405 I&D VULVA/PERINEAL ABSCESS: CPT | Mod: PBBFAC | Performed by: OBSTETRICS & GYNECOLOGY

## 2020-01-27 PROCEDURE — 56405 PR I&D OF VULVA/PERINEUM ABSCESS: ICD-10-PCS | Mod: S$PBB,,, | Performed by: OBSTETRICS & GYNECOLOGY

## 2020-01-27 PROCEDURE — 99999 PR PBB SHADOW E&M-EST. PATIENT-LVL III: ICD-10-PCS | Mod: PBBFAC,,, | Performed by: OBSTETRICS & GYNECOLOGY

## 2020-01-27 NOTE — PROGRESS NOTES
Subjective:    Patient ID: Sammi Pope is a 49 y.o. y.o. female.     Chief Complaint:   Chief Complaint   Patient presents with    Lesion     painful and drainage        History of Present Illness   Sammi presents today complaining of a vulvar bump that has been draining some with warm compresses/baths. Symptoms began 2  weeks and have been persistent.  No LMP recorded. Patient has had a hysterectomy..     ROS:   CONSTITUTIONAL: Negative for fever, chills, diaphoresis, weakness, fatigue, weight loss, weight gain  GASTROINTESTINAL: negative for abdominal pain, flank pain, nausea, vomiting, diarrhea, constipation, black stool, blood in stool  BREAST: negative for breast  tenderness, breast mass, nipple discharge, or skin changes  GENITOURINARY: lesion per HPI, negative for dysuria, frequency/urgency, hematuria, genital discharge, vaginal bleeding, irregular menses, heavy menses, pelvic pain  HEMATOLOGIC/LYMPHATIC: negative for swollen lymph nodes, bleeding, bruising  MUSCULOSKELETAL: negative for back pain, joint pain, joint stiffness, joint swelling, muscle pain, muscle weakness  ENDOCRINE: negative for polydipsia/polyuria, palpitations, skin changes, temperature intolerance, unexpected weight changes      Objective:    Vital Signs:  Vitals:    01/27/20 1545   BP: 116/88   Pulse: 78   Resp: 14       Physical Exam:  General:  alert, cooperative, no distress   Neuro/Psych: AAOx3, appropriate mood and affect   Head: Normocephalic, atraumatic   Neck: Supple, normal ROM   Resp: Normal effort   Skin:  Skin color, texture, turgor normal. No rashes or lesions     Pre-Vulvar I&D Counseling:  The patient was informed of the risk of bleeding, pain and infection.  She was counseled on the conservative treatment of perineal abscess as well as operative outpatient management, and she agrees to proceed with in-office incision and drainage.      Physical Exam   Genitourinary:           There is a 1 cm perineal abscess.   There is not surrounding cellulitis.  Normal hair distribution.      Procedure:  A time out was performed.  The base of the lesion was injected with 1% lidocaine with epinephrine.  A stab wound was made into the lesion with a #11 blade, and a small hemostat was used to widen the incision and allow for more adequate drainage.  The patient tolerated the procedure well.          Assessment:      1. Perineal abscess          Plan:      Perineal abscess      I&D performed.  Sitz baths and warm compresses.

## 2020-11-12 ENCOUNTER — TELEPHONE (OUTPATIENT)
Dept: OBSTETRICS AND GYNECOLOGY | Facility: CLINIC | Age: 50
End: 2020-11-12

## 2020-11-12 DIAGNOSIS — Z12.31 BREAST CANCER SCREENING BY MAMMOGRAM: Primary | ICD-10-CM

## 2020-11-12 NOTE — TELEPHONE ENCOUNTER
----- Message from Anna Isaacs sent at 11/12/2020 10:36 AM CST -----  Contact: self  Sammi Pope  MRN: [id number]  Home Phone      [phone]  Work Phone      Not on file.  Mobile          [phone]    Patient Care Team:  Luke Harrison MD as PCP - General (Pediatrics)  Geri Serrano LPN as Care Coordinator  OB? No  What phone number can you be reached at? [phone]  Message:  Please link Mammo orders to appt on 12/8/2020. Annual same day.

## 2020-12-04 ENCOUNTER — TELEPHONE (OUTPATIENT)
Dept: ADMINISTRATIVE | Facility: HOSPITAL | Age: 50
End: 2020-12-04

## 2020-12-08 ENCOUNTER — HOSPITAL ENCOUNTER (OUTPATIENT)
Dept: RADIOLOGY | Facility: HOSPITAL | Age: 50
Discharge: HOME | End: 2020-12-08
Attending: OBSTETRICS & GYNECOLOGY
Payer: MEDICAID

## 2020-12-08 ENCOUNTER — OFFICE VISIT (OUTPATIENT)
Dept: OBSTETRICS AND GYNECOLOGY | Facility: CLINIC | Age: 50
End: 2020-12-08
Payer: MEDICAID

## 2020-12-08 VITALS
SYSTOLIC BLOOD PRESSURE: 110 MMHG | RESPIRATION RATE: 14 BRPM | HEIGHT: 63 IN | DIASTOLIC BLOOD PRESSURE: 74 MMHG | HEART RATE: 87 BPM | BODY MASS INDEX: 30.16 KG/M2 | WEIGHT: 170.19 LBS

## 2020-12-08 VITALS — BODY MASS INDEX: 29.23 KG/M2 | WEIGHT: 165 LBS | HEIGHT: 63 IN

## 2020-12-08 DIAGNOSIS — Z12.11 COLON CANCER SCREENING: ICD-10-CM

## 2020-12-08 DIAGNOSIS — Z90.710 HISTORY OF HYSTERECTOMY: ICD-10-CM

## 2020-12-08 DIAGNOSIS — Z01.419 ENCOUNTER FOR GYNECOLOGICAL EXAMINATION (GENERAL) (ROUTINE) WITHOUT ABNORMAL FINDINGS: Primary | ICD-10-CM

## 2020-12-08 DIAGNOSIS — Z12.31 BREAST CANCER SCREENING BY MAMMOGRAM: ICD-10-CM

## 2020-12-08 PROCEDURE — 99396 PREV VISIT EST AGE 40-64: CPT | Mod: S$PBB,,, | Performed by: OBSTETRICS & GYNECOLOGY

## 2020-12-08 PROCEDURE — 77067 SCR MAMMO BI INCL CAD: CPT | Mod: 26,,, | Performed by: RADIOLOGY

## 2020-12-08 PROCEDURE — 77063 BREAST TOMOSYNTHESIS BI: CPT | Mod: 26,,, | Performed by: RADIOLOGY

## 2020-12-08 PROCEDURE — 77067 MAMMO DIGITAL SCREENING BILAT WITH TOMO: ICD-10-PCS | Mod: 26,,, | Performed by: RADIOLOGY

## 2020-12-08 PROCEDURE — 77067 SCR MAMMO BI INCL CAD: CPT | Mod: TC

## 2020-12-08 PROCEDURE — 99999 PR PBB SHADOW E&M-EST. PATIENT-LVL IV: CPT | Mod: PBBFAC,,, | Performed by: OBSTETRICS & GYNECOLOGY

## 2020-12-08 PROCEDURE — 99999 PR PBB SHADOW E&M-EST. PATIENT-LVL IV: ICD-10-PCS | Mod: PBBFAC,,, | Performed by: OBSTETRICS & GYNECOLOGY

## 2020-12-08 PROCEDURE — 77063 MAMMO DIGITAL SCREENING BILAT WITH TOMO: ICD-10-PCS | Mod: 26,,, | Performed by: RADIOLOGY

## 2020-12-08 PROCEDURE — 99396 PR PREVENTIVE VISIT,EST,40-64: ICD-10-PCS | Mod: S$PBB,,, | Performed by: OBSTETRICS & GYNECOLOGY

## 2020-12-08 PROCEDURE — 99214 OFFICE O/P EST MOD 30 MIN: CPT | Mod: PBBFAC | Performed by: OBSTETRICS & GYNECOLOGY

## 2020-12-08 RX ORDER — ISOSORBIDE MONONITRATE 30 MG/1
TABLET, EXTENDED RELEASE ORAL
COMMUNITY
Start: 2020-09-17 | End: 2022-01-05

## 2020-12-08 RX ORDER — GABAPENTIN 300 MG/1
CAPSULE ORAL
COMMUNITY
Start: 2020-10-15 | End: 2022-01-05

## 2020-12-08 RX ORDER — MELOXICAM 15 MG/1
TABLET ORAL
COMMUNITY
Start: 2020-10-15 | End: 2022-01-05

## 2020-12-08 RX ORDER — OXCARBAZEPINE 150 MG/1
TABLET, FILM COATED ORAL
COMMUNITY
Start: 2020-12-01 | End: 2022-01-05

## 2020-12-08 RX ORDER — CLONAZEPAM 0.5 MG/1
TABLET ORAL
COMMUNITY
Start: 2020-12-04

## 2020-12-08 RX ORDER — LAMOTRIGINE 25 MG/1
TABLET ORAL
COMMUNITY
Start: 2020-09-18 | End: 2022-01-05

## 2020-12-08 RX ORDER — BUSPIRONE HYDROCHLORIDE 10 MG/1
TABLET ORAL
COMMUNITY
Start: 2020-12-04 | End: 2022-01-05

## 2020-12-08 RX ORDER — BREXPIPRAZOLE 2 MG/1
TABLET ORAL
COMMUNITY
Start: 2020-12-03 | End: 2022-01-05

## 2020-12-08 RX ORDER — VORTIOXETINE 20 MG/1
TABLET, FILM COATED ORAL
COMMUNITY
Start: 2020-11-25 | End: 2022-01-05

## 2020-12-08 NOTE — PROGRESS NOTES
Subjective:    Patient ID: Sammi Pope is a 50 y.o. y.o. female.     Chief Complaint: Annual Well Woman Exam     History of Present Illness:  Sammi presents today for Annual Well Woman exam. She describes her menses as absent, h/o hysterectomy for pain in . She denies pelvic pain.  She admits to breast tenderness, and lumpy breasts, but no new masses, nipple discharge. She denies difficulty with urination or bowel movements. She denies menopausal symptoms such as hotflashes, vaginal dryness, and night sweats. She denies bloating, early satiety, or weight changes. She is not currently sexually active for the past month.       Menstrual History:   No LMP recorded. Patient has had a hysterectomy..     OB History    : 4  Para: 3  Term: 3  : 1  Spontaneous : 1            The following portions of the patient's history were reviewed and updated as appropriate: allergies, current medications, past family history, past medical history, past social history, past surgical history and problem list.    ROS:   CONSTITUTIONAL: fatigue, weight gain, Denies: fever, chills, diaphoresis, weakness, weight loss  ENT: negative for sore throat, nasal congestion, nasal discharge, epistaxis, tinnitus, hearing loss  EYES: blurry vision, Denies: eye pain, discharge  SKIN: Negative for rash, itching, hives  RESPIRATORY: negative for cough, hemoptysis, shortness of breath, pleuritic chest pain, wheezing  CARDIOVASCULAR: chest pain, Denies: edema, palpitations  BREAST: breast tenderness, and negative for breast mass, nipple discharge, or skin changes  GASTROINTESTINAL: negative for abdominal pain, flank pain, nausea, vomiting, diarrhea, constipation, black stool, blood in stool  GENITOURINARY: negative for abnormal vaginal bleeding, amenorrhea, decreased libido, dysuria, genital sores, hematuria, incontinence, menorrhagia, pelvic pain, urinary frequency, vaginal discharge  HEMATOLOGIC/LYMPHATIC: negative  for swollen lymph nodes, bleeding, bruising  MUSCULOSKELETAL: back pain, joint pain, muscle pain, muscle weakness, Denies: joint swelling  NEUROLOGICAL: headache, Denies: focal weakness, numbness/tingling  BEHAVORIAL/PSYCH: No psychosis and Positive for anxiety and depressioni  ENDOCRINE: negative for polydipsia/polyuria, palpitations, skin changes, temperature intolerance, unexpected weight changes  ALLERGIC/IMMUNOLOGIC: negative for urticaria, hay fever, angioedema      Objective:    Vital Signs:  Vitals:    12/08/20 1418   BP: 110/74   Pulse: 87   Resp: 14       Physical Exam:  General:  alert, cooperative, no distress   Skin:  Skin color, texture, turgor normal. No rashes or lesions   HEENT:  extra ocular movement intact, sclera clear, anicteric   Neck: supple, trachea midline, no adenopathy or thyromegally   Respiratory:  Normal effort   Breasts:  no discharge, erythema, tenderness, or palpable masses; no axillary lymphadenopathy   Abdomen:  soft, nontender, no palpable masses   Pelvis: External genitalia: normal general appearance  Urinary system: urethral meatus normal, bladder nontender  Vaginal: normal mucosa without prolapse or lesions  Cervix: removed surgically  Uterus: removed surgically  Adnexa: normal size, nontender bilaterally   Extremities: Normal ROM; no edema, no cyanosis   Neurologial: Normal strength and tone. No focal numbness or weakness.   Psychiatric: normal mood, speech, dress, and thought processes         Assessment:       Healthy female exam.     1. Encounter for gynecological examination (general) (routine) without abnormal findings    2. Colon cancer screening    3. History of hysterectomy          Plan:      Encounter for gynecological examination (general) (routine) without abnormal findings    Colon cancer screening  -     Fecal Immunochemical Test (iFOBT); Future; Expected date: 12/08/2020    History of hysterectomy        MMG done today, result pending.    COUNSELING:  Sammi was  counseled on A.C.O.G. Pap guidelines and recommendations for yearly pelvic exams in addition to recommendations for monthly self breast exams; to see her PCP for other health maintenance.

## 2021-11-03 ENCOUNTER — OFFICE VISIT (OUTPATIENT)
Dept: URGENT CARE | Facility: CLINIC | Age: 51
End: 2021-11-03
Payer: MEDICAID

## 2021-11-03 VITALS
HEIGHT: 63 IN | OXYGEN SATURATION: 99 % | RESPIRATION RATE: 15 BRPM | SYSTOLIC BLOOD PRESSURE: 106 MMHG | DIASTOLIC BLOOD PRESSURE: 78 MMHG | TEMPERATURE: 98 F | HEART RATE: 88 BPM | WEIGHT: 170.19 LBS | BODY MASS INDEX: 30.16 KG/M2

## 2021-11-03 DIAGNOSIS — L02.611 ABSCESS OF RIGHT FOOT: Primary | ICD-10-CM

## 2021-11-03 PROCEDURE — 99214 PR OFFICE/OUTPT VISIT, EST, LEVL IV, 30-39 MIN: ICD-10-PCS | Mod: S$GLB,,, | Performed by: PHYSICIAN ASSISTANT

## 2021-11-03 PROCEDURE — 99214 OFFICE O/P EST MOD 30 MIN: CPT | Mod: S$GLB,,, | Performed by: PHYSICIAN ASSISTANT

## 2021-11-03 RX ORDER — DESVENLAFAXINE 100 MG/1
100 TABLET, EXTENDED RELEASE ORAL DAILY
COMMUNITY

## 2021-11-03 RX ORDER — ROPINIROLE 0.5 MG/1
0.5 TABLET, FILM COATED ORAL 3 TIMES DAILY
COMMUNITY

## 2021-11-03 RX ORDER — MUPIROCIN 20 MG/G
OINTMENT TOPICAL 2 TIMES DAILY
Qty: 22 G | Refills: 0 | Status: SHIPPED | OUTPATIENT
Start: 2021-11-03 | End: 2021-11-13

## 2021-11-03 RX ORDER — PRAZOSIN HYDROCHLORIDE 1 MG/1
CAPSULE ORAL 2 TIMES DAILY
COMMUNITY

## 2021-11-03 RX ORDER — CLINDAMYCIN HYDROCHLORIDE 150 MG/1
450 CAPSULE ORAL 3 TIMES DAILY
Qty: 90 CAPSULE | Refills: 0 | Status: SHIPPED | OUTPATIENT
Start: 2021-11-03 | End: 2021-11-13

## 2021-11-03 RX ORDER — TOPIRAMATE 50 MG/1
50 TABLET, FILM COATED ORAL 2 TIMES DAILY
COMMUNITY

## 2021-11-18 ENCOUNTER — OFFICE VISIT (OUTPATIENT)
Dept: URGENT CARE | Facility: CLINIC | Age: 51
End: 2021-11-18
Payer: MEDICAID

## 2021-11-18 VITALS
BODY MASS INDEX: 30.16 KG/M2 | TEMPERATURE: 98 F | RESPIRATION RATE: 16 BRPM | HEART RATE: 121 BPM | HEIGHT: 63 IN | DIASTOLIC BLOOD PRESSURE: 79 MMHG | SYSTOLIC BLOOD PRESSURE: 108 MMHG | WEIGHT: 170.19 LBS | OXYGEN SATURATION: 96 %

## 2021-11-18 DIAGNOSIS — L08.9 LOCALIZED INFECTION OF SKIN: Primary | ICD-10-CM

## 2021-11-18 PROCEDURE — 99214 PR OFFICE/OUTPT VISIT, EST, LEVL IV, 30-39 MIN: ICD-10-PCS | Mod: S$GLB,,, | Performed by: NURSE PRACTITIONER

## 2021-11-18 PROCEDURE — 99214 OFFICE O/P EST MOD 30 MIN: CPT | Mod: S$GLB,,, | Performed by: NURSE PRACTITIONER

## 2021-11-18 RX ORDER — SULFAMETHOXAZOLE AND TRIMETHOPRIM 800; 160 MG/1; MG/1
1 TABLET ORAL 2 TIMES DAILY
Qty: 10 TABLET | Refills: 0 | Status: SHIPPED | OUTPATIENT
Start: 2021-11-18 | End: 2021-11-23

## 2021-11-18 RX ORDER — MUPIROCIN 20 MG/G
OINTMENT TOPICAL
Qty: 22 G | Refills: 1 | Status: SHIPPED | OUTPATIENT
Start: 2021-11-18 | End: 2022-01-05

## 2021-11-29 ENCOUNTER — TELEPHONE (OUTPATIENT)
Dept: OBSTETRICS AND GYNECOLOGY | Facility: CLINIC | Age: 51
End: 2021-11-29
Payer: MEDICAID

## 2021-11-29 DIAGNOSIS — Z12.31 SCREENING MAMMOGRAM FOR BREAST CANCER: Primary | ICD-10-CM

## 2022-01-05 ENCOUNTER — HOSPITAL ENCOUNTER (OUTPATIENT)
Dept: RADIOLOGY | Facility: HOSPITAL | Age: 52
Discharge: HOME | End: 2022-01-05
Attending: OBSTETRICS & GYNECOLOGY
Payer: MEDICAID

## 2022-01-05 ENCOUNTER — OFFICE VISIT (OUTPATIENT)
Dept: OBSTETRICS AND GYNECOLOGY | Facility: CLINIC | Age: 52
End: 2022-01-05
Payer: MEDICAID

## 2022-01-05 VITALS — BODY MASS INDEX: 32.78 KG/M2 | HEIGHT: 63 IN | WEIGHT: 185 LBS

## 2022-01-05 VITALS
BODY MASS INDEX: 32.64 KG/M2 | HEIGHT: 63 IN | DIASTOLIC BLOOD PRESSURE: 74 MMHG | WEIGHT: 184.19 LBS | SYSTOLIC BLOOD PRESSURE: 112 MMHG | HEART RATE: 61 BPM | RESPIRATION RATE: 15 BRPM

## 2022-01-05 DIAGNOSIS — Z01.419 WELL WOMAN EXAM WITH ROUTINE GYNECOLOGICAL EXAM: Primary | ICD-10-CM

## 2022-01-05 DIAGNOSIS — Z12.31 SCREENING MAMMOGRAM FOR BREAST CANCER: ICD-10-CM

## 2022-01-05 PROCEDURE — 99396 PR PREVENTIVE VISIT,EST,40-64: ICD-10-PCS | Mod: S$PBB,,, | Performed by: OBSTETRICS & GYNECOLOGY

## 2022-01-05 PROCEDURE — 1160F RVW MEDS BY RX/DR IN RCRD: CPT | Mod: CPTII,,, | Performed by: OBSTETRICS & GYNECOLOGY

## 2022-01-05 PROCEDURE — 77067 MAMMO DIGITAL SCREENING BILAT WITH TOMO: ICD-10-PCS | Mod: 26,,, | Performed by: RADIOLOGY

## 2022-01-05 PROCEDURE — 99214 OFFICE O/P EST MOD 30 MIN: CPT | Mod: PBBFAC | Performed by: OBSTETRICS & GYNECOLOGY

## 2022-01-05 PROCEDURE — 3074F SYST BP LT 130 MM HG: CPT | Mod: CPTII,,, | Performed by: OBSTETRICS & GYNECOLOGY

## 2022-01-05 PROCEDURE — 1160F PR REVIEW ALL MEDS BY PRESCRIBER/CLIN PHARMACIST DOCUMENTED: ICD-10-PCS | Mod: CPTII,,, | Performed by: OBSTETRICS & GYNECOLOGY

## 2022-01-05 PROCEDURE — 99999 PR PBB SHADOW E&M-EST. PATIENT-LVL IV: CPT | Mod: PBBFAC,,, | Performed by: OBSTETRICS & GYNECOLOGY

## 2022-01-05 PROCEDURE — 1159F MED LIST DOCD IN RCRD: CPT | Mod: CPTII,,, | Performed by: OBSTETRICS & GYNECOLOGY

## 2022-01-05 PROCEDURE — 77067 SCR MAMMO BI INCL CAD: CPT | Mod: 26,,, | Performed by: RADIOLOGY

## 2022-01-05 PROCEDURE — 3008F PR BODY MASS INDEX (BMI) DOCUMENTED: ICD-10-PCS | Mod: CPTII,,, | Performed by: OBSTETRICS & GYNECOLOGY

## 2022-01-05 PROCEDURE — 99396 PREV VISIT EST AGE 40-64: CPT | Mod: S$PBB,,, | Performed by: OBSTETRICS & GYNECOLOGY

## 2022-01-05 PROCEDURE — 3078F DIAST BP <80 MM HG: CPT | Mod: CPTII,,, | Performed by: OBSTETRICS & GYNECOLOGY

## 2022-01-05 PROCEDURE — 3008F BODY MASS INDEX DOCD: CPT | Mod: CPTII,,, | Performed by: OBSTETRICS & GYNECOLOGY

## 2022-01-05 PROCEDURE — 77063 BREAST TOMOSYNTHESIS BI: CPT | Mod: 26,,, | Performed by: RADIOLOGY

## 2022-01-05 PROCEDURE — 1159F PR MEDICATION LIST DOCUMENTED IN MEDICAL RECORD: ICD-10-PCS | Mod: CPTII,,, | Performed by: OBSTETRICS & GYNECOLOGY

## 2022-01-05 PROCEDURE — 3078F PR MOST RECENT DIASTOLIC BLOOD PRESSURE < 80 MM HG: ICD-10-PCS | Mod: CPTII,,, | Performed by: OBSTETRICS & GYNECOLOGY

## 2022-01-05 PROCEDURE — 99999 PR PBB SHADOW E&M-EST. PATIENT-LVL IV: ICD-10-PCS | Mod: PBBFAC,,, | Performed by: OBSTETRICS & GYNECOLOGY

## 2022-01-05 PROCEDURE — 77063 MAMMO DIGITAL SCREENING BILAT WITH TOMO: ICD-10-PCS | Mod: 26,,, | Performed by: RADIOLOGY

## 2022-01-05 PROCEDURE — 77063 BREAST TOMOSYNTHESIS BI: CPT | Mod: TC

## 2022-01-05 PROCEDURE — 3074F PR MOST RECENT SYSTOLIC BLOOD PRESSURE < 130 MM HG: ICD-10-PCS | Mod: CPTII,,, | Performed by: OBSTETRICS & GYNECOLOGY

## 2022-01-05 RX ORDER — BUSPIRONE HYDROCHLORIDE 15 MG/1
15 TABLET ORAL 3 TIMES DAILY
COMMUNITY
Start: 2021-12-27

## 2022-01-05 RX ORDER — CARIPRAZINE 1.5 MG/1
1 CAPSULE, GELATIN COATED ORAL DAILY
COMMUNITY
Start: 2022-01-03 | End: 2024-03-20

## 2022-01-05 RX ORDER — ASPIRIN 325 MG
50000 TABLET, DELAYED RELEASE (ENTERIC COATED) ORAL
COMMUNITY
Start: 2022-01-03

## 2022-01-05 RX ORDER — BETAMETHASONE DIPROPIONATE 0.5 MG/G
CREAM TOPICAL DAILY PRN
COMMUNITY
Start: 2021-11-22

## 2022-01-05 NOTE — PROGRESS NOTES
Subjective:    Patient ID: Sammi Pope is a 51 y.o. y.o. female.     Chief Complaint: Annual Well Woman Exam     History of Present Illness:  Sammi presents today for Annual Well Woman exam. She describes her menses as absent, h/o hysterectomy.She denies pelvic pain.  She denies breast tenderness, masses, nipple discharge. She denies GYN complaints. She denies difficulty with urination or bowel movements. She admits to mild menopausal symptoms such as hotflashes, vaginal dryness, and night sweats. She denies bloating, early satiety, or weight changes. She is sexually active.     Menstrual History:   No LMP recorded. Patient has had a hysterectomy..     OB History    : 4  Para: 3  Term: 3  : 1  Spontaneous : 1            The following portions of the patient's history were reviewed and updated as appropriate: allergies, current medications, past family history, past medical history, past social history, past surgical history and problem list.    ROS:   CONSTITUTIONAL: Negative for fever, chills, diaphoresis, weakness, fatigue, weight loss, weight gain  ENT: negative for sore throat, nasal congestion, nasal discharge, epistaxis, tinnitus, hearing loss  EYES: blurry vision, Denies: decreased vision, loss of vision, eye pain, discharge  SKIN: rash, Negative for itching, hives  RESPIRATORY: negative for cough, hemoptysis, shortness of breath, pleuritic chest pain, wheezing  CARDIOVASCULAR: negative for chest pain, dyspnea on exertion, orthopnea, paroxysmal nocturnal dyspnea, edema, palpitations  BREAST: negative for breast  tenderness, breast mass, nipple discharge, or skin changes  GASTROINTESTINAL: negative for abdominal pain, flank pain, nausea, vomiting, diarrhea, constipation, black stool, blood in stool  GENITOURINARY: negative for abnormal vaginal bleeding, amenorrhea, decreased libido, dysuria, genital sores, hematuria, incontinence, menorrhagia, pelvic pain, urinary frequency,  vaginal discharge  HEMATOLOGIC/LYMPHATIC: negative for swollen lymph nodes, bleeding, bruising  MUSCULOSKELETAL: back pain, joint pain, muscle pain, Denies: muscle weakness  NEUROLOGICAL: negative for dizzy/vertigo, headache, focal weakness, numbness/tingling, speech problems, loss of consciousness, confusion, memory loss  BEHAVORIAL/PSYCH: No psychosis and Positive for anxiety and depression on tx  ENDOCRINE: negative for polydipsia/polyuria, palpitations, skin changes, temperature intolerance, unexpected weight changes  ALLERGIC/IMMUNOLOGIC: negative for urticaria, hay fever, angioedema      Objective:    Vital Signs:  Vitals:    01/05/22 0948   BP: 112/74   Pulse: 61   Resp: 15       Physical Exam:  General:  alert, cooperative, no distress   Skin:  Skin color, texture, turgor normal. No rashes or lesions   HEENT:  extra ocular movement intact, sclera clear, anicteric   Neck: supple, trachea midline, no adenopathy or thyromegally   Respiratory:  Normal effort   Breasts:  no discharge, erythema, tenderness, or palpable masses; no axillary lymphadenopathy   Abdomen:  soft, nontender, no palpable masses   Pelvis: External genitalia: normal general appearance  Urinary system: urethral meatus normal, bladder nontender  Vaginal: normal mucosa without prolapse or lesions  Cervix: removed surgically  Uterus: removed surgically  Adnexa: normal size, nontender bilaterally   Extremities: Normal ROM; no edema, no cyanosis   Neurologial: Normal strength and tone. No focal numbness or weakness.   Psychiatric: normal mood, speech, dress, and thought processes         Assessment:       Healthy female exam.     1. Well woman exam with routine gynecological exam          Plan:      Well woman exam with routine gynecological exam        MMG normal  Pt with Fit testing done, but uncertain of results.  Will see if we can get the results sent to us.     COUNSELING:  Sammi was counseled on use and side-effects of various contraceptive  measures, A.C.O.G. Pap guidelines and recommendations for yearly pelvic exams in addition to recommendations for monthly self breast exams; to see her PCP for other health maintenance.

## 2022-06-03 NOTE — TELEPHONE ENCOUNTER
norco 5-325mg BID PRN #[id number] printed today in [place]   Chonodrocutaneous Helical Advancement Flap Text: The defect edges were debeveled with a #15 scalpel blade.  Given the location of the defect and the proximity to free margins a chondrocutaneous helical advancement flap was deemed most appropriate.  Using a sterile surgical marker, the appropriate advancement flap was drawn incorporating the defect and placing the expected incisions within the relaxed skin tension lines where possible.    The area thus outlined was incised deep to adipose tissue with a #15 scalpel blade.  The skin margins were undermined to an appropriate distance in all directions utilizing iris scissors.

## 2023-01-11 ENCOUNTER — TELEPHONE (OUTPATIENT)
Dept: OBSTETRICS AND GYNECOLOGY | Facility: CLINIC | Age: 53
End: 2023-01-11
Payer: MEDICAID

## 2023-01-11 DIAGNOSIS — Z12.31 BREAST CANCER SCREENING BY MAMMOGRAM: Primary | ICD-10-CM

## 2023-01-11 NOTE — TELEPHONE ENCOUNTER
----- Message from Nereyda Menchaca MA sent at 1/11/2023  9:26 AM CST -----  Contact: self  Smami Pope  MRN: [id number]  Home Phone      [phone]  Work Phone      Not on file.  Mobile          [phone]    Patient Care Team:  Luke Harrison MD as PCP - General (Pediatrics)  Geri Serrano LPN as Care Coordinator  OB? No  What phone number can you be reached at? [phone]  Message: Please link mammo orders to appt 3/7/23.

## 2023-03-07 ENCOUNTER — HOSPITAL ENCOUNTER (OUTPATIENT)
Dept: RADIOLOGY | Facility: HOSPITAL | Age: 53
Discharge: HOME | End: 2023-03-07
Attending: OBSTETRICS & GYNECOLOGY
Payer: MEDICAID

## 2023-03-07 ENCOUNTER — OFFICE VISIT (OUTPATIENT)
Dept: OBSTETRICS AND GYNECOLOGY | Facility: CLINIC | Age: 53
End: 2023-03-07
Payer: MEDICAID

## 2023-03-07 VITALS
DIASTOLIC BLOOD PRESSURE: 80 MMHG | HEIGHT: 63 IN | OXYGEN SATURATION: 95 % | HEART RATE: 82 BPM | BODY MASS INDEX: 31.84 KG/M2 | SYSTOLIC BLOOD PRESSURE: 100 MMHG | WEIGHT: 179.69 LBS

## 2023-03-07 VITALS — WEIGHT: 184 LBS | HEIGHT: 63 IN | BODY MASS INDEX: 32.6 KG/M2

## 2023-03-07 DIAGNOSIS — Z90.710 H/O: HYSTERECTOMY: ICD-10-CM

## 2023-03-07 DIAGNOSIS — Z01.419 WELL WOMAN EXAM WITH ROUTINE GYNECOLOGICAL EXAM: Primary | ICD-10-CM

## 2023-03-07 DIAGNOSIS — Z12.31 BREAST CANCER SCREENING BY MAMMOGRAM: ICD-10-CM

## 2023-03-07 PROCEDURE — 99213 OFFICE O/P EST LOW 20 MIN: CPT | Mod: PBBFAC | Performed by: OBSTETRICS & GYNECOLOGY

## 2023-03-07 PROCEDURE — 1159F PR MEDICATION LIST DOCUMENTED IN MEDICAL RECORD: ICD-10-PCS | Mod: CPTII,,, | Performed by: OBSTETRICS & GYNECOLOGY

## 2023-03-07 PROCEDURE — 77063 MAMMO DIGITAL SCREENING BILAT WITH TOMO: ICD-10-PCS | Mod: 26,,, | Performed by: RADIOLOGY

## 2023-03-07 PROCEDURE — 1160F RVW MEDS BY RX/DR IN RCRD: CPT | Mod: CPTII,,, | Performed by: OBSTETRICS & GYNECOLOGY

## 2023-03-07 PROCEDURE — 77067 SCR MAMMO BI INCL CAD: CPT | Mod: 26,,, | Performed by: RADIOLOGY

## 2023-03-07 PROCEDURE — 1159F MED LIST DOCD IN RCRD: CPT | Mod: CPTII,,, | Performed by: OBSTETRICS & GYNECOLOGY

## 2023-03-07 PROCEDURE — 3008F PR BODY MASS INDEX (BMI) DOCUMENTED: ICD-10-PCS | Mod: CPTII,,, | Performed by: OBSTETRICS & GYNECOLOGY

## 2023-03-07 PROCEDURE — 3079F PR MOST RECENT DIASTOLIC BLOOD PRESSURE 80-89 MM HG: ICD-10-PCS | Mod: CPTII,,, | Performed by: OBSTETRICS & GYNECOLOGY

## 2023-03-07 PROCEDURE — 99999 PR PBB SHADOW E&M-EST. PATIENT-LVL III: ICD-10-PCS | Mod: PBBFAC,,, | Performed by: OBSTETRICS & GYNECOLOGY

## 2023-03-07 PROCEDURE — 77063 BREAST TOMOSYNTHESIS BI: CPT | Mod: 26,,, | Performed by: RADIOLOGY

## 2023-03-07 PROCEDURE — 99999 PR PBB SHADOW E&M-EST. PATIENT-LVL III: CPT | Mod: PBBFAC,,, | Performed by: OBSTETRICS & GYNECOLOGY

## 2023-03-07 PROCEDURE — 99396 PR PREVENTIVE VISIT,EST,40-64: ICD-10-PCS | Mod: S$PBB,,, | Performed by: OBSTETRICS & GYNECOLOGY

## 2023-03-07 PROCEDURE — 3074F PR MOST RECENT SYSTOLIC BLOOD PRESSURE < 130 MM HG: ICD-10-PCS | Mod: CPTII,,, | Performed by: OBSTETRICS & GYNECOLOGY

## 2023-03-07 PROCEDURE — 3079F DIAST BP 80-89 MM HG: CPT | Mod: CPTII,,, | Performed by: OBSTETRICS & GYNECOLOGY

## 2023-03-07 PROCEDURE — 77067 MAMMO DIGITAL SCREENING BILAT WITH TOMO: ICD-10-PCS | Mod: 26,,, | Performed by: RADIOLOGY

## 2023-03-07 PROCEDURE — 99396 PREV VISIT EST AGE 40-64: CPT | Mod: S$PBB,,, | Performed by: OBSTETRICS & GYNECOLOGY

## 2023-03-07 PROCEDURE — 3008F BODY MASS INDEX DOCD: CPT | Mod: CPTII,,, | Performed by: OBSTETRICS & GYNECOLOGY

## 2023-03-07 PROCEDURE — 77067 SCR MAMMO BI INCL CAD: CPT | Mod: TC

## 2023-03-07 PROCEDURE — 1160F PR REVIEW ALL MEDS BY PRESCRIBER/CLIN PHARMACIST DOCUMENTED: ICD-10-PCS | Mod: CPTII,,, | Performed by: OBSTETRICS & GYNECOLOGY

## 2023-03-07 PROCEDURE — 3074F SYST BP LT 130 MM HG: CPT | Mod: CPTII,,, | Performed by: OBSTETRICS & GYNECOLOGY

## 2023-03-07 RX ORDER — ADALIMUMAB 40MG/0.4ML
40 KIT SUBCUTANEOUS
COMMUNITY

## 2023-03-07 NOTE — PROGRESS NOTES
Subjective:    Patient ID: Sammi Pope is a 52 y.o. y.o. female.     Chief Complaint: Annual Well Woman Exam     History of Present Illness:  Sammi presents today for Annual Well Woman exam. She describes her menses as  absent, h/o hysterectomy .She denies pelvic pain.  She denies breast tenderness, masses, nipple discharge. She denies GYN complaints. She denies difficulty with urination or bowel movements. She denies menopausal symptoms such as hotflashes, vaginal dryness, and night sweats. She denies bloating, early satiety, or weight changes. She is not sexually active.     Menstrual History:   No LMP recorded. Patient has had a hysterectomy..     OB History    : 4  Para: 3  Term: 3  : 1  Spontaneous : 1            The following portions of the patient's history were reviewed and updated as appropriate: allergies, current medications, past family history, past medical history, past social history, past surgical history, and problem list.    ROS:   CONSTITUTIONAL: fatigue, weight gain, Denies: fever, chills, diaphoresis, weakness, weight loss  ENT: negative for sore throat, nasal congestion, nasal discharge, epistaxis, tinnitus, hearing loss  EYES: negative for blurry vision, decreased vision, loss of vision, eye pain, diplopia, photophobia, discharge  SKIN: Negative for rash, itching, hives  RESPIRATORY: negative for cough, hemoptysis, shortness of breath, pleuritic chest pain, wheezing  CARDIOVASCULAR: negative for chest pain, dyspnea on exertion, orthopnea, paroxysmal nocturnal dyspnea, edema, palpitations  BREAST: breast fibrocystic changes and negative for breast  tenderness, nipple discharge, or skin changes  GASTROINTESTINAL: negative for abdominal pain, flank pain, nausea, vomiting, diarrhea, constipation, black stool, blood in stool  GENITOURINARY: negative for abnormal vaginal bleeding, amenorrhea, decreased libido, dysuria, genital sores, hematuria, incontinence,  menorrhagia, pelvic pain, urinary frequency, vaginal discharge  HEMATOLOGIC/LYMPHATIC: negative for swollen lymph nodes, bleeding, bruising  MUSCULOSKELETAL: back pain, joint pain, muscle pain, muscle weakness, Denies: joint stiffness, joint swelling  NEUROLOGICAL: numbness/tingling, negative for dizzy/vertigo, headache, focal weakness, speech problems, loss of consciousness, confusion, memory loss  BEHAVORIAL/PSYCH: No psychosis and Positive for anxiety and depression  ENDOCRINE: negative for polydipsia/polyuria, palpitations, skin changes, temperature intolerance, unexpected weight changes  ALLERGIC/IMMUNOLOGIC: negative for urticaria, hay fever, angioedema      Objective:    Vital Signs:  Vitals:    03/07/23 1103   BP: 100/80   Pulse: 82       Physical Exam:  General:  alert, cooperative, no distress   Skin:  Skin color, texture, turgor normal. No rashes or lesions   HEENT:  extra ocular movement intact, sclera clear, anicteric   Neck: supple, trachea midline, no adenopathy or thyromegally   Respiratory:  Normal effort   Breasts:  no discharge, erythema, tenderness, or palpable masses; no axillary lymphadenopathy   Abdomen:  soft, nontender, no palpable masses   Pelvis: External genitalia: normal general appearance  Urinary system: urethral meatus normal, bladder nontender  Vaginal: normal mucosa without prolapse or lesions  Cervix: removed surgically  Uterus: removed surgically  Adnexa: non palpable   Extremities: Normal ROM; no edema, no cyanosis   Neurologial: Normal strength and tone. No focal numbness or weakness.   Psychiatric: normal mood, speech, dress, and thought processes         Assessment:       Healthy female exam.     1. Well woman exam with routine gynecological exam    2. H/O: hysterectomy          Plan:      Well woman exam with routine gynecological exam    H/O: hysterectomy        MMG normal today.     COUNSELING:  Sammi was counseled on STD pevention, use and side-effects of various  contraceptive measures, A.C.O.G. Pap guidelines and recommendations for yearly pelvic exams in addition to recommendations for monthly self breast exams; to see her PCP for other health maintenance.

## 2023-09-28 ENCOUNTER — OFFICE VISIT (OUTPATIENT)
Dept: OBSTETRICS AND GYNECOLOGY | Facility: CLINIC | Age: 53
End: 2023-09-28
Payer: MEDICAID

## 2023-09-28 ENCOUNTER — TELEPHONE (OUTPATIENT)
Dept: OBSTETRICS AND GYNECOLOGY | Facility: CLINIC | Age: 53
End: 2023-09-28

## 2023-09-28 VITALS
WEIGHT: 172.63 LBS | DIASTOLIC BLOOD PRESSURE: 64 MMHG | SYSTOLIC BLOOD PRESSURE: 124 MMHG | HEIGHT: 63 IN | BODY MASS INDEX: 30.59 KG/M2 | HEART RATE: 100 BPM

## 2023-09-28 DIAGNOSIS — N90.89 VULVAR LESION: Primary | ICD-10-CM

## 2023-09-28 PROCEDURE — 1160F RVW MEDS BY RX/DR IN RCRD: CPT | Mod: CPTII,,, | Performed by: OBSTETRICS & GYNECOLOGY

## 2023-09-28 PROCEDURE — 99214 OFFICE O/P EST MOD 30 MIN: CPT | Mod: PBBFAC | Performed by: OBSTETRICS & GYNECOLOGY

## 2023-09-28 PROCEDURE — 1160F PR REVIEW ALL MEDS BY PRESCRIBER/CLIN PHARMACIST DOCUMENTED: ICD-10-PCS | Mod: CPTII,,, | Performed by: OBSTETRICS & GYNECOLOGY

## 2023-09-28 PROCEDURE — 87529 HSV DNA AMP PROBE: CPT | Performed by: OBSTETRICS & GYNECOLOGY

## 2023-09-28 PROCEDURE — 3078F DIAST BP <80 MM HG: CPT | Mod: CPTII,,, | Performed by: OBSTETRICS & GYNECOLOGY

## 2023-09-28 PROCEDURE — 1159F PR MEDICATION LIST DOCUMENTED IN MEDICAL RECORD: ICD-10-PCS | Mod: CPTII,,, | Performed by: OBSTETRICS & GYNECOLOGY

## 2023-09-28 PROCEDURE — 1159F MED LIST DOCD IN RCRD: CPT | Mod: CPTII,,, | Performed by: OBSTETRICS & GYNECOLOGY

## 2023-09-28 PROCEDURE — 3074F SYST BP LT 130 MM HG: CPT | Mod: CPTII,,, | Performed by: OBSTETRICS & GYNECOLOGY

## 2023-09-28 PROCEDURE — 99999 PR PBB SHADOW E&M-EST. PATIENT-LVL IV: CPT | Mod: PBBFAC,,, | Performed by: OBSTETRICS & GYNECOLOGY

## 2023-09-28 PROCEDURE — 99213 OFFICE O/P EST LOW 20 MIN: CPT | Mod: S$PBB,,, | Performed by: OBSTETRICS & GYNECOLOGY

## 2023-09-28 PROCEDURE — 3008F PR BODY MASS INDEX (BMI) DOCUMENTED: ICD-10-PCS | Mod: CPTII,,, | Performed by: OBSTETRICS & GYNECOLOGY

## 2023-09-28 PROCEDURE — 3074F PR MOST RECENT SYSTOLIC BLOOD PRESSURE < 130 MM HG: ICD-10-PCS | Mod: CPTII,,, | Performed by: OBSTETRICS & GYNECOLOGY

## 2023-09-28 PROCEDURE — 3008F BODY MASS INDEX DOCD: CPT | Mod: CPTII,,, | Performed by: OBSTETRICS & GYNECOLOGY

## 2023-09-28 PROCEDURE — 99213 PR OFFICE/OUTPT VISIT, EST, LEVL III, 20-29 MIN: ICD-10-PCS | Mod: S$PBB,,, | Performed by: OBSTETRICS & GYNECOLOGY

## 2023-09-28 PROCEDURE — 99999 PR PBB SHADOW E&M-EST. PATIENT-LVL IV: ICD-10-PCS | Mod: PBBFAC,,, | Performed by: OBSTETRICS & GYNECOLOGY

## 2023-09-28 PROCEDURE — 3078F PR MOST RECENT DIASTOLIC BLOOD PRESSURE < 80 MM HG: ICD-10-PCS | Mod: CPTII,,, | Performed by: OBSTETRICS & GYNECOLOGY

## 2023-09-28 NOTE — TELEPHONE ENCOUNTER
----- Message from Nereyda Menchaca MA sent at 9/28/2023  8:27 AM CDT -----  Contact: self  Sammi Pope  MRN: [id number]  Home Phone      [phone]  Work Phone      Not on file.  Mobile          [phone]    Patient Care Team:  Luke Harrison MD as PCP - General (Pediatrics)  Geri Serrano LPN (Inactive) as Care Coordinator  Candido Lopez MD as Consulting Physician (Obstetrics and Gynecology)  OB? No  What phone number can you be reached at? [phone]  Message: Would like to make appt for bump on vag.

## 2023-09-28 NOTE — TELEPHONE ENCOUNTER
Pt was called and she complained of painful bump to vagina x 1 week. Pt desires appt today. Appt scheduled with Dr. Luis today @ 2:45 pm. Pt voiced understanding.

## 2023-09-28 NOTE — PROGRESS NOTES
Subjective:       Patient ID: Sammi Pope is a 53 y.o. female.    Chief Complaint:  Vaginal Pain (Pt states the right side of her vagina has been hurting for about a week )      History of Present Illness  Patient presents complaining of pain on the right side of her vaginal area for about 1 week.  She is not noticed any vaginal discharge or odor.  She denies any bruising.  Patient upon questioning did not notice any lesions.    Menstrual History:  OB History          4    Para   3    Term   3            AB   1    Living             SAB   1    IAB        Ectopic        Multiple        Live Births                    Menarche age:  No LMP recorded. Patient has had a hysterectomy.         Review of Systems  Review of Systems   Constitutional:  Negative for activity change, appetite change, chills, diaphoresis, fatigue, fever and unexpected weight change.   HENT:  Negative for congestion, dental problem, drooling, ear discharge, ear pain, facial swelling, hearing loss, mouth sores, nosebleeds, postnasal drip, rhinorrhea, sinus pressure, sneezing, sore throat, tinnitus, trouble swallowing and voice change.    Eyes:  Negative for photophobia, pain, discharge, redness, itching and visual disturbance.   Respiratory:  Negative for apnea, cough, choking, chest tightness, shortness of breath, wheezing and stridor.    Cardiovascular:  Negative for chest pain, palpitations and leg swelling.   Gastrointestinal:  Negative for abdominal distention, abdominal pain, anal bleeding, blood in stool, constipation, diarrhea, nausea, rectal pain and vomiting.   Endocrine: Negative for cold intolerance, heat intolerance, polydipsia, polyphagia and polyuria.   Genitourinary:  Positive for vaginal pain. Negative for decreased urine volume, difficulty urinating, dyspareunia, dysuria, enuresis, flank pain, frequency, genital sores, hematuria, menstrual problem, pelvic pain, urgency, vaginal bleeding and vaginal discharge.    Musculoskeletal:  Positive for back pain, neck pain and neck stiffness. Negative for arthralgias, gait problem, joint swelling and myalgias.   Skin:  Negative for color change, pallor, rash and wound.   Allergic/Immunologic: Negative for environmental allergies, food allergies and immunocompromised state.   Neurological:  Negative for dizziness, tremors, seizures, syncope, facial asymmetry, speech difficulty, weakness, light-headedness, numbness and headaches.   Hematological:  Negative for adenopathy. Does not bruise/bleed easily.   Psychiatric/Behavioral:  Negative for agitation, behavioral problems, confusion, decreased concentration, dysphoric mood, hallucinations, self-injury, sleep disturbance and suicidal ideas. The patient is nervous/anxious. The patient is not hyperactive.          Objective:      Physical Exam  Vitals and nursing note reviewed. Exam conducted with a chaperone present.   Genitourinary:            Assessment:        1. Vulvar lesion                Plan:         Sammi was seen today for vaginal pain.    Diagnoses and all orders for this visit:    Vulvar lesion

## 2023-10-01 LAB
HSV1 DNA SPEC QL NAA+PROBE: NEGATIVE
HSV2 DNA SPEC QL NAA+PROBE: NEGATIVE
SPECIMEN SOURCE: NORMAL

## 2023-10-19 ENCOUNTER — OFFICE VISIT (OUTPATIENT)
Dept: URGENT CARE | Facility: CLINIC | Age: 53
End: 2023-10-19
Payer: MEDICAID

## 2023-10-19 VITALS
SYSTOLIC BLOOD PRESSURE: 98 MMHG | HEIGHT: 63 IN | RESPIRATION RATE: 18 BRPM | DIASTOLIC BLOOD PRESSURE: 75 MMHG | HEART RATE: 110 BPM | WEIGHT: 175 LBS | OXYGEN SATURATION: 96 % | TEMPERATURE: 101 F | BODY MASS INDEX: 31.01 KG/M2

## 2023-10-19 DIAGNOSIS — J02.9 ACUTE PHARYNGITIS, UNSPECIFIED ETIOLOGY: Primary | ICD-10-CM

## 2023-10-19 DIAGNOSIS — J01.40 ACUTE NON-RECURRENT PANSINUSITIS: ICD-10-CM

## 2023-10-19 PROCEDURE — 99214 OFFICE O/P EST MOD 30 MIN: CPT | Mod: S$GLB,,, | Performed by: FAMILY MEDICINE

## 2023-10-19 PROCEDURE — 99214 PR OFFICE/OUTPT VISIT, EST, LEVL IV, 30-39 MIN: ICD-10-PCS | Mod: S$GLB,,, | Performed by: FAMILY MEDICINE

## 2023-10-19 RX ORDER — DEXTROMETHORPHAN HBR, GUAIFENESIN AND PSEUDOEPHEDRINE HCL 60; 380; 20 MG/1; MG/1; MG/1
1 TABLET ORAL EVERY 6 HOURS PRN
Qty: 20 TABLET | Refills: 0 | Status: SHIPPED | OUTPATIENT
Start: 2023-10-19

## 2023-10-19 RX ORDER — CEFDINIR 300 MG/1
300 CAPSULE ORAL 2 TIMES DAILY
Qty: 20 CAPSULE | Refills: 0 | Status: SHIPPED | OUTPATIENT
Start: 2023-10-19 | End: 2023-10-29

## 2023-10-19 RX ORDER — NAPROXEN 500 MG/1
500 TABLET ORAL 2 TIMES DAILY WITH MEALS
Qty: 20 TABLET | Refills: 0 | Status: SHIPPED | OUTPATIENT
Start: 2023-10-19

## 2023-10-19 NOTE — LETTER
October 19, 2023  Sammi Pope  [address] [place] [address]                [facility]  [address]  [place] [address]  Phone: [phone]  Fax: [phone] Sammi Pope was seen and treated in our Urgent Care department on 10/19/2023. She may return to work in 2 - 3 days.      If you have any questions or concerns, please don't hesitate to call.        Sincerely,        Tay Chowdary MD

## 2023-10-19 NOTE — PATIENT INSTRUCTIONS
Please drink plenty of fluids.  Please get plenty of rest.  Please return here or go to the Emergency Department for any concerns or worsening of condition.  If you were given wait & see antibiotics, please wait 3-5 days before taking them, and only take them if your symptoms have worsened or not improved.  If you do begin taking the antibiotics, please take them to completion.  If you were prescribed antibiotics, please take them to completion.  If you were prescribed a narcotic medication, do not drive or operate heavy equipment or machinery while taking these medications.    You were given a decongestant (RESCON or POLY VENT Dm).  If your insurance does not cover it or you cannot afford it, it is ok to use the over the counter products listed below.  If you do not have Hypertension or any history of palpitations, it is ok to take over the counter Sudafed or Mucinex D or Allegra-D or Claritin-D or Zyrtec-D.  If you do take one of the above, it is ok to combine that with plain over the counter Mucinex or Allegra or Claritin or Zyrtec.  If for example you are taking Zyrtec -D, you can combine that with Mucinex, but not Mucinex-D.  If you are taking Mucinex-D, you can combine that with plain Allegra or Claritin or Zyrtec.   If you do have Hypertension or palpitations, it is safe to take Coricidin HBP for relief of sinus symptoms.    We recommend you take over the counter Flonase (Fluticasone) or another nasally inhaled steroid unless you are already taking one.  Nasal irrigation with a saline spray or Netti Pot like device per their directions is also recommended.  If not allergic, please take over the counter Tylenol (Acetaminophen) and/or Motrin (Ibuprofen) as directed for control of pain and/or fever.    Robitussin DM 2 teas every 4 hours as needed for cough.  If you  smoke, please stop smoking.    Please follow up with your primary care doctor or specialist as needed.  [facility]  [phone]    You must  understand that you have received treatment at an Urgent Care facility only, and that you may be  released before all of your medical problems are known or treated. Urgent Care facilities are not equipped to  handle life threatening emergencies. It is recommended that you seek care at an Emergency Department for  further evaluation of worsening or concerning symptoms, or possibly life threatening conditions as  discussed.    Pharyngitis: Strep (Presumed)    You have pharyngitis (sore throat). The cause is thought to be the streptococcus, or strep, bacterium. Strep throat infection can cause throat pain that is worse when swallowing, aching all over, headache, and fever. The infection may be spread by coughing, kissing, or touching others after touching your mouth or nose. Antibiotic medications are given to treat the infection.  Home care  Rest at home. Drink plenty of fluids to avoid dehydration.  No work or school for the first 2 days of taking the antibiotics. After this time, you will not be contagious. You can then return to work or school if you are feeling better.   The antibiotic medication must be taken for the full 10 days, even if you feel better. This is very important to ensure the infection is treated. It is also important to prevent drug-resistant organisms from developing. If you were given an antibiotic shot, no more antibiotics are needed.  You may use acetaminophen or ibuprofen to control pain or fever, unless another medicine was prescribed for this. If you have chronic liver or kidney disease or ever had a stomach ulcer or GI bleeding, talk with your doctor before using these medicines.  Throat lozenges or a throat-numbing sprays can help reduce throat pain. Gargling with warm salt water can also help. Dissolve 1/2 teaspoon of salt in 1 8 ounce glass of warm water.   Avoid salty or spicy foods, which can irritate the throat.  Follow-up care  Follow up with your healthcare provider or our staff  if you are not improving over the next week.  When to seek medical advice  Call your healthcare provider right away if any of these occur:  Fever as directed by your doctor.   New or worsening ear pain, sinus pain, or headache  Painful lumps in the back of neck  Stiff neck  Lymph nodes are getting larger  Inability to swallow liquids, excessive drooling, or inability to open mouth wide due to throat pain  Signs of dehydration (very dark urine or no urine, sunken eyes, dizziness)  Trouble breathing or noisy breathing  Muffled voice  New rash  Date Last Reviewed: 4/13/2015 © 2000-2016 [employer]. [address] [place] [id number]. All rights reserved. This information is not intended as a substitute for professional medical care. Always follow your healthcare professional's instructions.      Acute Bacterial Rhinosinusitis (ABRS)  Acute bacterial rhinosinusitis (ABRS) is an infection of your nasal cavity and sinuses. Its caused by bacteria. Acute means that youve had symptoms for less than 12 weeks.  Understanding your sinuses  The nasal cavity is the large air-filled space behind your nose. The sinuses are a group of spaces formed by the bones of your face. They connect with your nasal cavity. ABRS causes the tissue lining these spaces to become inflamed. Mucus may not drain normally. This leads to facial pain and other symptoms.  What causes ABRS?  ABRS most often follows an upper respiratory infection caused by a virus. Bacteria then infect the lining of your nasal cavity and sinuses. But you can also get ABRS if you have:  Nasal allergies  Long-term nasal swelling and congestion not caused by allergies  Blockage in the nose  Symptoms of ABRS  The symptoms of ABRS may be different for each person, and can include:  Nasal congestion  Runny nose  Fluid draining from the nose down the throat (postnasal drip)  Headache  Cough  Pain in the sinuses  Thick, colored fluid from the nose  (mucus)  Fever  Diagnosing ABRS  ABRS may be diagnosed if youve had an upper respiratory infection like a cold and cough for longer than 10 to 14 days. Your health care provider will ask about your symptoms and your medical history. The provider will check your vital signs, including your temperature. Youll have a physical exam. The health care provider will check your ears, nose, and throat. You likely wont need any tests. If ABRS comes back, you may have a culture or other tests.  Treatment for ABRS  Treatment may include:  Antibiotic medicine. This is for symptoms that last for at least 10 to 14 days.  Nasal corticosteroid medicine. Drops or spray used in the nose can lessen swelling and congestion.  Over-the-counter pain medicine. This is to lessen sinus pain and pressure.  Nasal decongestant medicine. Spray or drops may help to lessen congestion. Do not use them for more than a few days.  Salt wash (saline irrigation). This can help to loosen mucus.  Possible complications of ABRS  ABRS may come back or become long-term (chronic).  In rare cases, ABRS may cause complications such as:   Inflamed tissue around the brain and spinal cord (meningitis)  Inflamed tissue around the eyes (orbital cellulitis)  Inflamed bones around the sinuses (osteitis)  These problems may need to be treated in a hospital with intravenous (IV) antibiotic medicine or surgery.  When to call the health care provider  Call your health care provider if you have any of the following:  Symptoms that dont get better, or get worse  Symptoms that dont get better after 3 to 5 days on antibiotics  Trouble seeing  Swelling around your eyes  Confusion or trouble staying awake   Date Last Reviewed: 3/3/2015  © 5837-0311 [employer]. [address] [place] [address], [place] [address]. All rights reserved. This information is not intended as a substitute for professional medical care. Always follow your healthcare professional's  instructions.

## 2023-10-19 NOTE — PROGRESS NOTES
"Subjective:      Patient ID: Sammi Pope is a 53 y.o. female.    Vitals:  height is 5' 3" (1.6 m) and weight is 79.4 kg (175 lb). Her oral temperature is 100.7 °F (38.2 °C) (abnormal). Her blood pressure is 98/75 and her pulse is 110. Her respiration is 18 and oxygen saturation is 96%.     Chief Complaint: Cough    Pt is coming in today for a cough and loss of voice that began this morning. Pt has tried no medication since she has two stints in her heart.    Cough  This is a new problem. The current episode started more than 1 year ago. The problem has been unchanged. The problem occurs constantly. The cough is Productive of sputum (green). Associated symptoms include ear pain, headaches, nasal congestion and a sore throat. Pertinent negatives include no fever. Nothing aggravates the symptoms. She has tried nothing for the symptoms. The treatment provided no relief. Her past medical history is significant for pneumonia.       Constitution: Negative. Negative for fever.   HENT:  Positive for ear pain and sore throat.    Cardiovascular: Negative.    Eyes: Negative.    Respiratory:  Positive for cough.    Gastrointestinal: Negative.    Endocrine: negative.   Genitourinary: Negative.    Musculoskeletal: Negative.    Skin: Negative.    Allergic/Immunologic: Negative.    Neurological:  Positive for headaches.   Hematologic/Lymphatic: Negative.    Psychiatric/Behavioral: Negative.        Objective:     Physical Exam   Constitutional: She is oriented to person, place, and time. She appears well-developed. She is cooperative.  Non-toxic appearance. She does not appear ill. No distress.   HENT:   Head: Normocephalic and atraumatic.   Ears:   Right Ear: Hearing, tympanic membrane, external ear and ear canal normal.   Left Ear: Hearing, tympanic membrane, external ear and ear canal normal.   Nose: No mucosal edema, rhinorrhea or nasal deformity. No epistaxis. Right sinus exhibits maxillary sinus tenderness and frontal " sinus tenderness. Left sinus exhibits maxillary sinus tenderness and frontal sinus tenderness.   Mouth/Throat: Uvula is midline and mucous membranes are normal. No trismus in the jaw. Normal dentition. No uvula swelling. Posterior oropharyngeal edema and posterior oropharyngeal erythema present. No oropharyngeal exudate.   Eyes: Conjunctivae and lids are normal. No scleral icterus.   Neck: Trachea normal and phonation normal. Neck supple. No edema present. No erythema present. No neck rigidity present.   Cardiovascular: Normal rate, regular rhythm, normal heart sounds and normal pulses.   Pulmonary/Chest: Effort normal and breath sounds normal. No respiratory distress. She has no decreased breath sounds. She has no rhonchi.   Abdominal: Normal appearance.   Musculoskeletal: Normal range of motion.         General: No deformity or edema. Normal range of motion.   Neurological: She is alert and oriented to person, place, and time. She exhibits normal muscle tone. Coordination normal.   Skin: Skin is warm, dry, intact, not diaphoretic and not pale.   Psychiatric: Her speech is normal and behavior is normal. Judgment and thought content normal.   Nursing note and vitals reviewed.      Assessment:     1. Acute pharyngitis, unspecified etiology    2. Acute non-recurrent pansinusitis        Plan:       Acute pharyngitis, unspecified etiology    Acute non-recurrent pansinusitis  -     cefdinir (OMNICEF) 300 MG capsule; Take 1 capsule (300 mg total) by mouth 2 (two) times daily. for 10 days  Dispense: 20 capsule; Refill: 0  -     pseudoephedrine-DM-guaiFENesin (POLY-VENT DM) 60- mg Tab; Take 1 tablet by mouth every 6 (six) hours as needed.  Dispense: 20 tablet; Refill: 0  -     naproxen (NAPROSYN) 500 MG tablet; Take 1 tablet (500 mg total) by mouth 2 (two) times daily with meals.  Dispense: 20 tablet; Refill: 0      Please drink plenty of fluids.  Please get plenty of rest.  Please return here or go to the Emergency  Department for any concerns or worsening of condition.  If you were given wait & see antibiotics, please wait 3-5 days before taking them, and only take them if your symptoms have worsened or not improved.  If you do begin taking the antibiotics, please take them to completion.  If you were prescribed antibiotics, please take them to completion.  If you were prescribed a narcotic medication, do not drive or operate heavy equipment or machinery while taking these medications.    You were given a decongestant (RESCON or POLY VENT Dm).  If your insurance does not cover it or you cannot afford it, it is ok to use the over the counter products listed below.  If you do not have Hypertension or any history of palpitations, it is ok to take over the counter Sudafed or Mucinex D or Allegra-D or Claritin-D or Zyrtec-D.  If you do take one of the above, it is ok to combine that with plain over the counter Mucinex or Allegra or Claritin or Zyrtec.  If for example you are taking Zyrtec -D, you can combine that with Mucinex, but not Mucinex-D.  If you are taking Mucinex-D, you can combine that with plain Allegra or Claritin or Zyrtec.   If you do have Hypertension or palpitations, it is safe to take Coricidin HBP for relief of sinus symptoms.    We recommend you take over the counter Flonase (Fluticasone) or another nasally inhaled steroid unless you are already taking one.  Nasal irrigation with a saline spray or Netti Pot like device per their directions is also recommended.  If not allergic, please take over the counter Tylenol (Acetaminophen) and/or Motrin (Ibuprofen) as directed for control of pain and/or fever.    Robitussin DM 2 teas every 4 hours as needed for cough.  If you  smoke, please stop smoking.    Please follow up with your primary care doctor or specialist as needed.  [facility]  [phone]    You must understand that you have received treatment at an Urgent Care facility only, and that you may be  released  before all of your medical problems are known or treated. Urgent Care facilities are not equipped to  handle life threatening emergencies. It is recommended that you seek care at an Emergency Department for  further evaluation of worsening or concerning symptoms, or possibly life threatening conditions as  discussed.

## 2023-10-23 ENCOUNTER — TELEPHONE (OUTPATIENT)
Dept: URGENT CARE | Facility: CLINIC | Age: 53
End: 2023-10-23
Payer: MEDICAID

## 2023-10-23 NOTE — TELEPHONE ENCOUNTER
Called back patient for follow up visit 4 days ago for pharyngitis and sinusitis.  Patient states she is feeling better, symptoms improving with treatment.  Recommended continuing antibiotics until completion, take other medications as needed.  Follow up with us if symptoms persist or worsen.

## 2024-03-20 ENCOUNTER — OFFICE VISIT (OUTPATIENT)
Dept: OBSTETRICS AND GYNECOLOGY | Facility: CLINIC | Age: 54
End: 2024-03-20
Payer: MEDICAID

## 2024-03-20 ENCOUNTER — HOSPITAL ENCOUNTER (OUTPATIENT)
Dept: RADIOLOGY | Facility: HOSPITAL | Age: 54
Discharge: HOME | End: 2024-03-20
Attending: OBSTETRICS & GYNECOLOGY
Payer: MEDICAID

## 2024-03-20 VITALS
WEIGHT: 180.81 LBS | BODY MASS INDEX: 32.04 KG/M2 | HEIGHT: 63 IN | HEART RATE: 85 BPM | SYSTOLIC BLOOD PRESSURE: 118 MMHG | DIASTOLIC BLOOD PRESSURE: 76 MMHG

## 2024-03-20 VITALS — HEIGHT: 63 IN | BODY MASS INDEX: 31.89 KG/M2 | WEIGHT: 180 LBS

## 2024-03-20 DIAGNOSIS — Z01.419 WELL WOMAN EXAM WITH ROUTINE GYNECOLOGICAL EXAM: Primary | ICD-10-CM

## 2024-03-20 DIAGNOSIS — Z12.31 ENCOUNTER FOR SCREENING MAMMOGRAM FOR BREAST CANCER: ICD-10-CM

## 2024-03-20 PROCEDURE — 3074F SYST BP LT 130 MM HG: CPT | Mod: CPTII,,, | Performed by: OBSTETRICS & GYNECOLOGY

## 2024-03-20 PROCEDURE — 3008F BODY MASS INDEX DOCD: CPT | Mod: CPTII,,, | Performed by: OBSTETRICS & GYNECOLOGY

## 2024-03-20 PROCEDURE — 99999 PR PBB SHADOW E&M-EST. PATIENT-LVL III: CPT | Mod: PBBFAC,,, | Performed by: OBSTETRICS & GYNECOLOGY

## 2024-03-20 PROCEDURE — 77067 SCR MAMMO BI INCL CAD: CPT | Mod: 26,,, | Performed by: RADIOLOGY

## 2024-03-20 PROCEDURE — 77063 BREAST TOMOSYNTHESIS BI: CPT | Mod: 26,,, | Performed by: RADIOLOGY

## 2024-03-20 PROCEDURE — 77063 BREAST TOMOSYNTHESIS BI: CPT | Mod: TC

## 2024-03-20 PROCEDURE — 3078F DIAST BP <80 MM HG: CPT | Mod: CPTII,,, | Performed by: OBSTETRICS & GYNECOLOGY

## 2024-03-20 PROCEDURE — 99213 OFFICE O/P EST LOW 20 MIN: CPT | Mod: PBBFAC | Performed by: OBSTETRICS & GYNECOLOGY

## 2024-03-20 PROCEDURE — 99459 PELVIC EXAMINATION: CPT | Mod: PBBFAC | Performed by: OBSTETRICS & GYNECOLOGY

## 2024-03-20 PROCEDURE — 99396 PREV VISIT EST AGE 40-64: CPT | Mod: S$PBB,,, | Performed by: OBSTETRICS & GYNECOLOGY

## 2024-03-20 PROCEDURE — 99459 PELVIC EXAMINATION: CPT | Mod: S$PBB,,, | Performed by: OBSTETRICS & GYNECOLOGY

## 2024-03-20 PROCEDURE — 1159F MED LIST DOCD IN RCRD: CPT | Mod: CPTII,,, | Performed by: OBSTETRICS & GYNECOLOGY

## 2024-03-20 RX ORDER — PRAZOSIN HYDROCHLORIDE 2 MG/1
2 CAPSULE ORAL NIGHTLY
COMMUNITY
Start: 2023-12-20

## 2024-03-20 RX ORDER — DESVENLAFAXINE SUCCINATE 50 MG/1
50 TABLET, EXTENDED RELEASE ORAL
COMMUNITY
Start: 2024-03-09

## 2024-03-20 RX ORDER — DUPILUMAB 300 MG/2ML
INJECTION, SOLUTION SUBCUTANEOUS
COMMUNITY

## 2024-03-20 RX ORDER — QUETIAPINE FUMARATE 200 MG/1
200 TABLET, FILM COATED ORAL NIGHTLY
COMMUNITY
Start: 2024-03-11

## 2024-03-20 RX ORDER — CARIPRAZINE 3 MG/1
3 CAPSULE, GELATIN COATED ORAL
COMMUNITY

## 2024-03-20 RX ORDER — CLONAZEPAM 1 MG/1
1 TABLET ORAL 2 TIMES DAILY
COMMUNITY
Start: 2024-03-18

## 2024-03-20 NOTE — PROGRESS NOTES
Subjective:    Patient ID: Sammi Pope is a 53 y.o. y.o. female.     Chief Complaint: Annual Well Woman Exam     History of Present Illness:  Sammi presents today for Annual Well Woman exam. She describes her menses as  absent, hysterectomy .She denies pelvic pain.  She denies breast tenderness, masses, nipple discharge. She denies GYN complaints. She denies difficulty with urination or bowel movements. She denies menopausal symptoms such as hotflashes, vaginal dryness, and night sweats. She denies bloating, early satiety, or weight changes. She is not currently sexually active.       Menstrual History:   No LMP recorded. Patient has had a hysterectomy..     OB History    : 4  Para: 3  Term: 3  : 1  Spontaneous : 1            The following portions of the patient's history were reviewed and updated as appropriate: allergies, current medications, past family history, past medical history, past social history, past surgical history, and problem list.    ROS:   Review of Systems   Constitutional:  Negative for chills, diaphoresis, fatigue, fever and unexpected weight change.   HENT:  Negative for congestion, hearing loss, postnasal drip, rhinorrhea, sinus pressure, sinus pain, sore throat and tinnitus.    Eyes:  Negative for pain, discharge and visual disturbance.   Respiratory:  Negative for apnea, cough, shortness of breath and wheezing.    Cardiovascular:  Positive for chest pain. Negative for palpitations and leg swelling.   Gastrointestinal:  Negative for abdominal pain, constipation, diarrhea, nausea and vomiting.   Endocrine: Negative for cold intolerance, heat intolerance, polydipsia, polyphagia and polyuria.   Genitourinary:  Negative for difficulty urinating, dyspareunia, dysuria, enuresis, flank pain, frequency, genital sores, hematuria, menstrual problem, pelvic pain, urgency, vaginal bleeding, vaginal discharge and vaginal pain.   Musculoskeletal:  Negative for arthralgias,  back pain, joint swelling, myalgias, neck pain and neck stiffness.   Skin:  Negative for color change, pallor and rash.   Allergic/Immunologic: Negative for environmental allergies, food allergies and immunocompromised state.   Neurological:  Negative for dizziness, weakness, light-headedness, numbness and headaches.   Hematological:  Negative for adenopathy. Does not bruise/bleed easily.   Psychiatric/Behavioral:  Negative for agitation and confusion. The patient is nervous/anxious.          Objective:    Vital Signs:  Vitals:    03/20/24 0934   BP: 118/76   Pulse: 85       Physical Exam:   Examination performed with Chaperone present  General:  alert, cooperative, no distress   Skin:  Skin color, texture, turgor normal. No rashes or lesions   HEENT:  extra ocular movement intact, sclera clear, anicteric   Neck: supple, trachea midline, no adenopathy or thyromegally   Respiratory:  Normal effort   Breasts:  no discharge, erythema, tenderness, or palpable masses; no axillary lymphadenopathy   Abdomen:  soft, nontender, no palpable masses   Pelvis: External genitalia: normal general appearance  Urinary system: urethral meatus normal, bladder nontender  Vaginal: normal without tenderness, induration or masses  Cervix: removed surgically  Uterus: removed surgically  Adnexa: non palpable   Extremities: Normal ROM; no edema, no cyanosis   Neurologial: Normal strength and tone. No focal numbness or weakness.   Psychiatric: normal mood, speech, dress, and thought processes         Assessment:       Healthy female exam.     1. Well woman exam with routine gynecological exam          Plan:      Well woman exam with routine gynecological exam        MMG scheduled today.     COUNSELING:  Sammi was counseled on [facility].C.O.G. Pap guidelines and recommendations for yearly pelvic exams in addition to recommendations for monthly self breast exams; to see her PCP for other health maintenance.

## 2024-03-27 DIAGNOSIS — R92.2 INCONCLUSIVE MAMMOGRAM: Primary | ICD-10-CM

## 2024-03-28 ENCOUNTER — HOSPITAL ENCOUNTER (OUTPATIENT)
Dept: RADIOLOGY | Facility: HOSPITAL | Age: 54
Discharge: HOME | End: 2024-03-28
Attending: OBSTETRICS & GYNECOLOGY
Payer: MEDICAID

## 2024-03-28 DIAGNOSIS — R92.2 INCONCLUSIVE MAMMOGRAM: ICD-10-CM

## 2024-03-28 PROCEDURE — 77065 DX MAMMO INCL CAD UNI: CPT | Mod: TC,LT

## 2024-03-28 PROCEDURE — 77061 BREAST TOMOSYNTHESIS UNI: CPT | Mod: 26,LT,, | Performed by: RADIOLOGY

## 2024-03-28 PROCEDURE — 77061 BREAST TOMOSYNTHESIS UNI: CPT | Mod: TC,LT

## 2024-03-28 PROCEDURE — 77065 DX MAMMO INCL CAD UNI: CPT | Mod: 26,LT,, | Performed by: RADIOLOGY

## 2025-03-11 ENCOUNTER — OFFICE VISIT (OUTPATIENT)
Dept: PAIN MEDICINE | Facility: CLINIC | Age: 55
End: 2025-03-11
Payer: MEDICAID

## 2025-03-11 ENCOUNTER — TELEPHONE (OUTPATIENT)
Dept: PAIN MEDICINE | Facility: CLINIC | Age: 55
End: 2025-03-11

## 2025-03-11 VITALS
HEART RATE: 89 BPM | DIASTOLIC BLOOD PRESSURE: 67 MMHG | BODY MASS INDEX: 31.83 KG/M2 | SYSTOLIC BLOOD PRESSURE: 105 MMHG | WEIGHT: 179.63 LBS | HEIGHT: 63 IN

## 2025-03-11 DIAGNOSIS — M47.816 LUMBAR FACET ARTHROPATHY: Primary | ICD-10-CM

## 2025-03-11 DIAGNOSIS — M51.360 DEGENERATION OF INTERVERTEBRAL DISC OF LUMBAR REGION WITH DISCOGENIC BACK PAIN: ICD-10-CM

## 2025-03-11 DIAGNOSIS — G89.4 CHRONIC PAIN SYNDROME: ICD-10-CM

## 2025-03-11 DIAGNOSIS — G89.29 CHRONIC BILATERAL LOW BACK PAIN WITHOUT SCIATICA: ICD-10-CM

## 2025-03-11 DIAGNOSIS — M54.50 CHRONIC BILATERAL LOW BACK PAIN WITHOUT SCIATICA: ICD-10-CM

## 2025-03-11 PROCEDURE — 99213 OFFICE O/P EST LOW 20 MIN: CPT | Mod: PBBFAC | Performed by: ANESTHESIOLOGY

## 2025-03-11 PROCEDURE — 3008F BODY MASS INDEX DOCD: CPT | Mod: CPTII,,, | Performed by: ANESTHESIOLOGY

## 2025-03-11 PROCEDURE — 1159F MED LIST DOCD IN RCRD: CPT | Mod: CPTII,,, | Performed by: ANESTHESIOLOGY

## 2025-03-11 PROCEDURE — 99999 PR PBB SHADOW E&M-EST. PATIENT-LVL III: CPT | Mod: PBBFAC,,, | Performed by: ANESTHESIOLOGY

## 2025-03-11 PROCEDURE — 3074F SYST BP LT 130 MM HG: CPT | Mod: CPTII,,, | Performed by: ANESTHESIOLOGY

## 2025-03-11 PROCEDURE — 99205 OFFICE O/P NEW HI 60 MIN: CPT | Mod: S$PBB,,, | Performed by: ANESTHESIOLOGY

## 2025-03-11 PROCEDURE — 1160F RVW MEDS BY RX/DR IN RCRD: CPT | Mod: CPTII,,, | Performed by: ANESTHESIOLOGY

## 2025-03-11 PROCEDURE — 3078F DIAST BP <80 MM HG: CPT | Mod: CPTII,,, | Performed by: ANESTHESIOLOGY

## 2025-03-11 RX ORDER — TIZANIDINE 4 MG/1
4 TABLET ORAL 2 TIMES DAILY
COMMUNITY

## 2025-03-11 RX ORDER — BUPROPION HYDROCHLORIDE 150 MG/1
150 TABLET ORAL DAILY
COMMUNITY
Start: 2024-10-31

## 2025-03-11 RX ORDER — SEMAGLUTIDE 0.25 MG/.5ML
0.5 INJECTION, SOLUTION SUBCUTANEOUS WEEKLY
COMMUNITY

## 2025-03-11 NOTE — PROGRESS NOTES
New Patient Evaluation  [facility]    Sammi Pope  : 1970  Date: 3/11/2025     CHIEF COMPLAINT:  Low-back Pain and Hip Pain (Up to mid thigh, bilateral)    Referring Physician: Rizwan Winchester NP  Primary Care Physician: Alexus, Start    HPI:  This is a 54 y.o. female with a chief complaint of Low-back Pain and Hip Pain (Up to mid thigh, bilateral)  . The patient has Past medical history/Past surgical history of   right hip pain, tobacco use disorder, lumbar radiculopathy, hypertension, CAD s/p stents  Patient was evaluated and referred by  primary care provider for low back pain, she was started on Lyrica 75 mg daily  She has MRI lumbar spine completed outside [facility]05/2024 that showed at L4-L5 moderate disc bulge, no central canal stenosis, neural foramina narrowing are moderately narrowed bilaterally by disc bulge and facet hypertrophy, at L5-S1 there is a moderate disc bulge with desiccation of the disc with no central canal stenosis and neural foramina narrowing    [place] [facility] [place]. States she has tried multiple Lumbar Epidural Steroid Injections with little relief lasting 2-3 weeks only.     Diabetic: No    Anticoagulation medications: None    Allergy To Iodine: No    Currently on Antibiotic: No    Current Description of Pain Symptoms:    History of Recent Fall or Trauma: Yes   Onset: Chronic, started years ago  Pain Location: low back  Radiates/associated symptoms: bilateral hips to mid thigh.   Pain is Getting worse over the last few years   The pain is described as aching, stabbing, and throbbing.   Exacerbating factors: nothing in particular.   Mitigating factors nothing in particular.   Symptoms interfere with daily activity, sleeping,.   The patient feels like symptoms have been worsening.   Patient denies night fever/night sweats, urinary incontinence, bowel incontinence, significant weight loss, and loss of  sensations.    Pain score:   Current: 10/10  Best: 8/10  Worst: 10/10    Current pain medications:   Tizanidine as needed for pain:4 mg BID    Current Narcotics/Opioid /benzo Medications:  Opioids- None  Benzodiazepines: Yes    UDS:  NA    PDMP:  Reviewed and consistent with medication use as prescribed.     Previous Chronic Pain Treatment History:  Six weeks of conservative therapy include: Physical Therapy/HEP/Physician Lead Exercise Program:  Over the past 12 months, Patient has done  0 sessions. Completed in 2016  PT response: Not Helpful.   Dates of the PT sessions: 2016  Is patient actively participating in home exercise program (HEP)/ physician led exercise program in the last 6 months: yes.    Non-interventional Pain Therapy:  [x]Chiropractor.   []Acupuncture/Dry needle.  []TENS unit.  []Heat/ICE.  []Back Brace.    Medications previously tried:  NSAIDs: Ibuprofen (Advil/Motrin), Meloxicam (Mobic), Celebrex (Celecoxib), Naproxen (Naprosyn), and Diclofenac  Topical Agent: Yes Lidocaine patches  TCA/SSRI/SNRI: TCA: Amitriptyline (Elavil), SSRI: Escitalopram (Lexapro), SSRI: Fluoxetine (Prozac), SSRI: Paroxetine (Paxil, Pexeva), SSRI: Sertraline (Zoloft), and SNRI: Duloxetine (Cymbalta)   Anti-convulsants: Gabapentin , Lyrica , Topiramate , and Lamotrigine   Muscle Relaxants: Flexeril (Cyclobenzaprine), Tizanidine (Zanaflex), and Baclofen   Opioids- Oxycodone with Acetaminophen (Percocet), Hydrocodone with Acetaminophen (Norco), Codeine with Acetaminophen, and Tramadol (Ultram).    Interventional Pain Procedures:   multiple Lumbar facet joint injection at L4-L5  2023, 2024 and 2025   Cervical facet joint injection at C5-C6 and C6-C7 2022   Carpal and cubital tunnel flexion ultrasound guided 2022  Lumbar epidural steroid injection 2020 and 2021  Cervical trigger point injection in 2020  ( No previous medial branch block or ablation)  Previous spine/Relevant joint surgery:  N/a  Surgical History:   has a past  "surgical history that includes  section; Dilation and curettage of uterus; Tubal ligation; Coronary angioplasty with stent (2019); Left heart catheterization (Left, 3/13/2019); Left heart catheterization (Left, 2019); Breast biopsy (Right, ); and Hysterectomy ().  Medical History:   has a past medical history of Bulging lumbar disc, Current every day smoker, H/O heart artery stent, Heart attack, Hypercholesteremia, Hypertension, MI (myocardial infarction), Migraines, and Psoriasis.  Family History:  family history includes Breast cancer in her paternal aunt; Diabetes in her sister; Hypertension in her mother; Lung cancer in her father.  Allergies:  Codeine, Cyclobenzaprine, Diclofenac, Gabapentin, Isosorbide, Pregabalin, and Tramadol   Social History/SUBSTANCE ABUSE HISTORY:  Personal history of substance abuse: No   reports that she has been smoking cigarettes. She started smoking about 14 years ago. She has a 14.2 pack-year smoking history. She has never used smokeless tobacco. She reports that she does not drink alcohol and does not use drugs.  LABS:  CBC  Lab Results   Component Value Date    WBC 9.20 2020    HGB 11.1 (L) 2020    HCT 33.4 (L) 2020     Coagulation Profile   Lab Results   Component Value Date     2020       Lab Results   Component Value Date    INR 1.0 2019     CMP:  BMP  Lab Results   Component Value Date     2020    K 3.9 2020     2020    CO2 27 2020    BUN 15 2020    CREATININE 1.00 2020    CALCIUM 9.7 2020    ANIONGAP 11 2019     Lab Results   Component Value Date    ALT 15 2020    AST 23 2020    ALKPHOS 77 2020    BILITOT 1.0 2020     HGBA1C:  No results found for: "LABA1C", "HGBA1C"    ROS:    Review of Systems   GENERAL:  No weight loss, malaise or fevers.  HEENT:   No recent changes in vision or hearing  NECK:  Negative for lumps, no difficulty " "with swallowing.  RESPIRATORY:  Negative for cough, wheezing or shortness of breath, patient denies any recent URI.  CARDIOVASCULAR:  Negative for chest pain or palpitations.  GI:  Negative for abdominal discomfort, blood in stools or black stools or change in bowel habits.  MUSCULOSKELETAL:  See HPI.  SKIN:  Negative for lesions, rash, and itching.  PSYCH:  No mood disorder or recent psychosocial stressors.   HEMATOLOGY/LYMPHOLOGY:  See the blood thinner sectioned in HPI.  NEURO:  See HPI  All other reviewed and negative other than HPI.    PHYSICAL EXAM:  VITALS: /67   Pulse 89   Ht 5' 3" (1.6 m)   Wt 81.5 kg (179 lb 9.6 oz)   BMI 31.81 kg/m²   Body mass index is 31.81 kg/m².  GENERAL: Well appearing, in no acute distress, alert and oriented x3, answers questions appropriately.   PSYCH: Flat affect.  SKIN: Skin color, texture, turgor normal, no rashes or lesions.  HEAD/FACE:  Normocephalic, atraumatic. Cranial nerves grossly intact.  CV: Regular rate  PULM: No evidence of respiratory difficulty, symmetric chest rise.  GI:  Soft and non-Distended.    BACK/SIJ/HIP:  Lumbar Spine Exam:       Inspection: No erythema, bruising.       Palpation: (+++) TTP of lumbar paraspinals bilaterally      ROM:  Limited in flexion, extension, lateral bending.       (+++) Facet loading bilateral      (-) Straight Leg Raise, bilateral      (Negative) YULI, Tenderness over the PSIS, Yeoman test, bilateral  Hip Exam:      Inspection: No gross deformity or apparent leg length discrepancy      Palpation:  No TTP to bilateral greater trochanteric bursas.       ROM:  no limitation Due to pain in internal rotation, external rotation b/l  Neurologic Exam:     Alert. Speech is fluent and appropriate.     Strength: 4/5 in bilateral hip flexion and knee extension     Sensation:  Grossly intact to light touch in bilateral lower extremities     Tone: No abnormality appreciated in bilateral lower extremities    GAIT:  antalgic, " unsteady    DIAGNOSTIC STUDIES AND MEDICAL RECORDS REVIEW:  I have personally reviewed and interpreted relevant radiology reports and reviewed relevant records from other services in the EMR.   - MRI lumbar spine 05/2024  There is disc space narrowing at L4-L5. There is moderate disc bulging at L4-L5 and L5-S1. The bowel gas patter appears unremarkable. There is a probable left renal cyst unchanged  L1-L2: There is no central or foramen stenosis.  L2-L3: There is no central or foramen stenosis.  L3-L4: There is no central or foramen stenosis.  L4-15: There is moderate disc bulging There is no central canal stenosis. The neural foramen are moderately narrowed bilaterally by disc bulging and facet hypertrophy.  L5-S1: There is moderate disc bulging with desiccation of the disc. There is no central canal stenosis. There is no foraminal stenosis.  Clinical Impression:  This is a pleasant 54 y.o. female patient with PMH/PSH of  right hip pain, tobacco use disorder, lumbar radiculopathy, hypertension, CAD s/p stents, presenting with axial low back pain, nonradiating, patient had lumbar epidural steroid injection, lumbar facet joint injection outside [facility] without sustained pain relief, patient has completed multiple sessions of physical therapy prior with no benefit, patient declined formal physical therapy.   Sammi Pope has chronic, moderate to severe axial lower back pain present for over the past 3 months that has failed to respond to physical therapy, PT-directed home exercises, NSAIDs, and muscle relaxants. There is no untreated radiculopathy or claudication present.  The patient has clinical and radiologic findings suggestive of facet mediated pain.  We will schedule for 1st diagnostic bilateral lumbar L4/5 and L5/S1 medial branch blocks.      We discussed the underlying diagnoses and multiple treatment options including non-opioid medications, interventional procedures, physical therapy, home exercise, core  muscle enhancement, and weight loss.  The risks and benefits of each treatment option were discussed and all questions were answered.      Encounter Diagnosis:  Sammi Pope is a 54 y.o. female with the following diagnoses based on history, exam, and imagin. Lumbar facet arthropathy    2. Chronic bilateral low back pain without sciatica    3. Degeneration of intervertebral disc of lumbar region with discogenic back pain    4. Chronic pain syndrome       Treatment Plan:    Diagnostics/Referrals:   [place] [facility]    Medications:    NSAIDs: OTC  Topical Agent: No  TCA/SSRI/SNRI: None  Anti-convulsants: None  Muscle Relaxants:  continue tizanidine as prescribed  Opioids: None    Interventional Therapy:  please schedule for bilateral lumbar medial branch block at L4-L5 and L5-S1  Sedation: Oral Sedation.  Anticoagulation medications: None -Clearance to stop Blood thinner: NA    Regarding the above interventions, the patient has been educated regarding the risks (including bleeding, infection, increased pain, nerve damage, or allergic reaction), benefits, and alternatives. The patient states she understands and is eager to proceed.    Physical Rehabilitation: Physician led exercise program and home exercise    Patient Education: Counseled patient regarding the importance of activity modification, I have stressed the importance of physical activity and a home exercise plan to help with pain and improve health.    Follow-up: RTC  after ablation.    May consider:  bilateral L4 transforaminal    I would like to thank Rizwan Winchester NP for the opportunity to assist in the care of this patient. We had a very nice visit and I look forward to continuing their care. Please let me know if I can be of further assistance.     Catarino Schneider MD  Anesthesiologist  Interventional Pain Medicine  2025    Disclaimer:  This note was prepared using voice recognition system and is likely to  have sound alike errors that may have been overlooked even after proof reading.  Please call me with any questions.

## 2025-03-11 NOTE — TELEPHONE ENCOUNTER
----- Message from Catarino Schneider MD sent at 3/11/2025  2:31 PM CDT -----  · Interventional Therapy:  please schedule for bilateral lumbar medial branch block at L4-L5 and L5-S1- 1st injection· Sedation: Oral Sedation.· Anticoagulation medications: None -Clearance to stop Blood thinner: NA

## 2025-03-11 NOTE — PROGRESS NOTES
New Patient Evaluation  [facility]    Sammi Pope  : 1970  Date: 3/11/2025     CHIEF COMPLAINT:  No chief complaint on file.    Referring Physician: Rizwan Winchester NP  Primary Care Physician: Alexus, Start    HPI:  This is a 54 y.o. female with a chief complaint of No chief complaint on file.  . The patient has Past medical history/Past surgical history of   right hip pain, tobacco use disorder, lumbar radiculopathy, hypertension  Patient was evaluated and referred by  primary care provider for low back pain, she was started on Lyrica 75 mg daily  She has MRI lumbar spine completed outside [facility]05/2024 that showed at L4-L5 moderate disc bulge, no central canal stenosis, neural foramina narrowing are moderately narrowed bilaterally by disc bulge and facet hypertrophy, at L5-S1 there is a moderate disc bulge with desiccation of the disc with no central canal stenosis and neural foramina narrowing    Diabetic: {GAYes/No/NA:38330}    {Anticoagulation medications:60924}    Allergy To Iodine: {GAYes/No/NA:00031}    Currently on Antibiotic: {GAYes/No/NA:00934}    Current Description of Pain Symptoms:    History of Recent Fall or Trauma: {GAYes/No/NA:02910}   Onset: Chronic, started ***  Pain Location: ***  Radiates/associated symptoms: ***.   Pain is Getting worse over the last *** months    The pain is described as {Desc; pain character:74929}.   Exacerbating factors: {Causes; Pain:67371}.   Mitigating factors ***.   Symptoms interfere with daily activity, sleeping, and ***.   The patient feels like symptoms have been {IUW:96538}.   Patient {Denies / Reports:80222} {RED FLAGS:84232}.    Pain score:   Current: {PAIN 0-10:45923}/10  Best: {PAIN 0-10:27794}/10  Worst: {PAIN 0-10:05400}/10    Current pain medications:  Current Medications[1]    Current Narcotics/Opioid /benzo Medications:  Opioids- {GAopioid:26501}  Benzodiazepines: {GAYes/No/NA:99890}    UDS:  NA    PDMP:  {:55750}      Previous Chronic Pain Treatment History:  Six weeks of conservative therapy include: Physical Therapy/HEP/Physician Lead Exercise Program:  Over the past 12 months, Patient has done  *** sessions.  PT response: {PT response:62660} Helpful.   Dates of the PT sessions: ***, ***.  Is patient actively participating in home exercise program (HEP)/ physician led exercise program in the last 6 months: {GAYes/No/NA:87110}.    Non-interventional Pain Therapy:  []Chiropractor.   []Acupuncture/Dry needle.  []TENS unit.  []Heat/ICE.  []Back Brace.    Medications previously tried:  NSAIDs: {GANSIAD:46784}  Topical Agent: {GAYes/No/NA:63010}  TCA/SSRI/SNRI: {GATCA/SSRI/SNRI:65988}  Anti-convulsants: {GAAnticonvulsants:72173}  Muscle Relaxants: {GAmuscle Relaxant:08137}  Opioids- {GAopioid:92078}.    Interventional Pain Procedures:  ***    Previous spine/Relevant joint surgery:  ***  Surgical History:   has a past surgical history that includes  section; Dilation and curettage of uterus; Tubal ligation; Coronary angioplasty with stent (2019); Left heart catheterization (Left, 3/13/2019); Left heart catheterization (Left, 2019); Breast biopsy (Right, ); and Hysterectomy ().  Medical History:   has a past medical history of Bulging lumbar disc, Current every day smoker, H/O heart artery stent, Heart attack, Hypercholesteremia, Hypertension, MI (myocardial infarction), Migraines, and Psoriasis.  Family History:  family history includes Breast cancer in her paternal aunt; Diabetes in her sister; Hypertension in her mother; Lung cancer in her father.  Allergies:  Codeine, Cyclobenzaprine, Gabapentin, Isosorbide, and Tramadol   Social History/SUBSTANCE ABUSE HISTORY:  Personal history of substance abuse: No   reports that she has been smoking cigarettes. She started smoking about 14 years ago. She has a 14.2 pack-year smoking history. She has never used smokeless tobacco. She reports that she does not  "drink alcohol and does not use drugs.  LABS:  CBC  Lab Results   Component Value Date    WBC 9.20 01/01/2020    HGB 11.1 (L) 01/01/2020    HCT 33.4 (L) 01/01/2020     Coagulation Profile   Lab Results   Component Value Date     01/01/2020       Lab Results   Component Value Date    INR 1.0 03/13/2019     CMP:  BMP  Lab Results   Component Value Date     01/01/2020    K 3.9 01/01/2020     01/01/2020    CO2 27 01/01/2020    BUN 15 01/01/2020    CREATININE 1.00 01/01/2020    CALCIUM 9.7 01/01/2020    ANIONGAP 11 03/13/2019     Lab Results   Component Value Date    ALT 15 01/01/2020    AST 23 01/01/2020    ALKPHOS 77 01/01/2020    BILITOT 1.0 01/01/2020     HGBA1C:  No results found for: "LABA1C", "HGBA1C"    ROS:    Review of Systems   GENERAL:  No weight loss, malaise or fevers.  HEENT:   No recent changes in vision or hearing  NECK:  Negative for lumps, no difficulty with swallowing.  RESPIRATORY:  Negative for cough, wheezing or shortness of breath, patient denies any recent URI.  CARDIOVASCULAR:  Negative for chest pain or palpitations.  GI:  Negative for abdominal discomfort, blood in stools or black stools or change in bowel habits.  MUSCULOSKELETAL:  See HPI.  SKIN:  Negative for lesions, rash, and itching.  PSYCH:  No mood disorder or recent psychosocial stressors.   HEMATOLOGY/LYMPHOLOGY:  See the blood thinner sectioned in HPI.  NEURO:  See HPI  All other reviewed and negative other than HPI.    PHYSICAL EXAM:  VITALS: There were no vitals taken for this visit.  There is no height or weight on file to calculate BMI.  GENERAL: Well appearing, in no acute distress, alert and oriented x3, answers questions appropriately.   PSYCH: Flat affect.  SKIN: Skin color, texture, turgor normal, no rashes or lesions.  HEAD/FACE:  Normocephalic, atraumatic. Cranial nerves grossly intact.  CV: Regular rate  PULM: No evidence of respiratory difficulty, symmetric chest rise.  GI:  Soft and " non-Distended.  NECK: ({GA+:36467}) pain to palpation over the cervical paraspinous muscles. Spurling:{GA+:32140}. ({GA+:99686}) pain with neck flexion, extension, or lateral flexion, Muscle strength in RT UE ***/5 and Left UE ***/5, Hand  ***/5, left Hand ***/5  BACK/SIJ/HIP:  Lumbar Spine Exam:       Inspection: No erythema, bruising.       Palpation: ({GA+:77648}) TTP of lumbar paraspinals bilaterally      ROM:  Limited in flexion, extension, lateral bending.       ({GA+:82996}) Facet loading {GAHip:15278}      ({GA+:54058}) Straight Leg Raise, {GAHip:84404}      ({GA+:68371}) YULI, Tenderness over the PSIS, Yeoman test, {GAHip:03198}  Hip Exam:      Inspection: No gross deformity or apparent leg length discrepancy      Palpation:  No TTP to bilateral greater trochanteric bursas.       ROM:  *** limitation Due to pain in internal rotation, external rotation b/l  Neurologic Exam:     Alert. Speech is fluent and appropriate.     Strength: ***/5 in {GAHip:76152} hip flexion and knee extension     Sensation:  Grossly intact to light touch in bilateral lower extremities     Tone: No abnormality appreciated in bilateral lower extremities  Knee exam:  No gross deformity or apparent leg length discrepancy, positive tenderness over the anteromedial aspect of the knee cap, positive limitation due to pain in flexion and extension, sensation  grossly intact to light touch in bilateral lower extremity, no atrophy or tone abnormalities    GAIT: {GAgait:21410}    DIAGNOSTIC STUDIES AND MEDICAL RECORDS REVIEW:  I have personally reviewed and interpreted relevant radiology reports and reviewed relevant records from other services in the EMR.   ***  Clinical Impression:  This is a pleasant 54 y.o. female patient with PMH/PSH of ***, presenting with***.     We discussed the underlying diagnoses and multiple treatment options including non-opioid medications, interventional procedures, physical therapy, home exercise, core  "muscle enhancement, and weight loss.  The risks and benefits of each treatment option were discussed and all questions were answered.      Encounter Diagnosis:  Sammi Pope is a 54 y.o. female with the following diagnoses based on history, exam, and imaging:  There are no diagnoses linked to this encounter.     Treatment Plan:    Diagnostics/Referrals: {gaimage:75719}    Medications:    NSAIDs: {GANSIAD:98770}  Topical Agent: {GAYes/No/NA:32230}  TCA/SSRI/SNRI: {GATCA/SSRI/SNRI:97008}  Anti-convulsants: {GAAnticonvulsants:86568}  Muscle Relaxants: {GAmuscle Relaxant:31219}  Opioids: {GAopioid:71520::"None"}  Patient was educated about the risk and benefit of chronic opioid therapy including dependency, addiction, diversion, and opioid hyperalgesia.  Interventional Therapy: {GAProcedure:53991}.  Sedation: {GAsedation:35814}.  {Anticoagulation medications:16523} -Clearance to stop Blood thinner: {GAYes/No/NA:29949}    Regarding the above interventions, the patient has been educated regarding the risks (including bleeding, infection, increased pain, nerve damage, or allergic reaction), benefits, and alternatives. The patient states she understands and is eager to proceed.    Physical Rehabilitation: {GAPT:84364}    Patient Education: Counseled patient regarding the importance of {:89517}, I have stressed the importance of physical activity and a home exercise plan to help with pain and improve health.    Follow-up: RTC ***.    May consider:     I would like to thank Rizwan Winchester NP for the opportunity to assist in the care of this patient. We had a very nice visit and I look forward to continuing their care. Please let me know if I can be of further assistance.     Catarino Schneider MD  Anesthesiologist  Interventional Pain Medicine  03/11/2025    Disclaimer:  This note was prepared using voice recognition system and is likely to have sound alike errors that may have been overlooked even after proof reading.  " Please call me with any questions.         [1]   Current Outpatient Medications:     adalimumab (HUMIRA,CF,) 40 mg/0.4 mL SyKt, Inject 40 mg into the skin every 14 (fourteen) days., Disp: , Rfl:     atorvastatin (LIPITOR) 40 MG tablet, Take 1 tablet (40 mg total) by mouth once daily. (Patient taking differently: Take 40 mg by mouth every evening.), Disp: 90 tablet, Rfl: 3    augmented betamethasone dipropionate (DIPROLENE-AF) 0.05 % cream, Apply topically daily as needed., Disp: , Rfl:     busPIRone (BUSPAR) 15 MG tablet, Take 15 mg by mouth 3 (three) times daily., Disp: , Rfl:     cholecalciferol, vitamin D3, 1,250 mcg (50,000 unit) capsule, Take 50,000 Units by mouth every 7 days., Disp: , Rfl:     clonazePAM (KLONOPIN) 0.5 MG tablet, TK 1 T PO TID, Disp: , Rfl:     clonazePAM (KLONOPIN) 1 MG tablet, Take 1 mg by mouth 2 (two) times daily., Disp: , Rfl:     clopidogrel (PLAVIX) 75 mg tablet, Take 1 tablet (75 mg total) by mouth once daily., Disp: 30 tablet, Rfl: 11    desvenlafaxine succinate (PRISTIQ) 100 MG Tb24, Take 100 mg by mouth once daily., Disp: , Rfl:     desvenlafaxine succinate (PRISTIQ) 50 MG Tb24, Take 50 mg by mouth., Disp: , Rfl:     dupilumab (DUPIXENT PEN SUBQ), Inject into the skin every 14 (fourteen) days., Disp: , Rfl:     DUPIXENT  mg/2 mL PnIj, SMARTSI Milligram(s) SUB-Q Every 2 Weeks, Disp: , Rfl:     metoprolol tartrate (LOPRESSOR) 25 MG tablet, Take 1 tablet (25 mg total) by mouth 2 (two) times daily., Disp: 60 tablet, Rfl: 11    naproxen (NAPROSYN) 500 MG tablet, Take 1 tablet (500 mg total) by mouth 2 (two) times daily with meals., Disp: 20 tablet, Rfl: 0    nitroGLYCERIN (NITROSTAT) 0.4 MG SL tablet, Place 0.4 mg under the tongue every 5 (five) minutes as needed for Chest pain., Disp: , Rfl:     pantoprazole (PROTONIX) 40 MG tablet, Take 40 mg by mouth once daily., Disp: , Rfl:     prazosin (MINIPRESS) 1 MG Cap, Take by mouth 2 (two) times daily., Disp: , Rfl:     prazosin  (MINIPRESS) 2 MG Cap, Take 2 mg by mouth every evening., Disp: , Rfl:     pseudoephedrine-DM-guaiFENesin (POLY-VENT DM) 60- mg Tab, Take 1 tablet by mouth every 6 (six) hours as needed., Disp: 20 tablet, Rfl: 0    QUEtiapine (SEROQUEL) 200 MG Tab, Take 200 mg by mouth every evening., Disp: , Rfl:     rOPINIRole (REQUIP) 0.5 MG tablet, Take 0.5 mg by mouth 3 (three) times daily., Disp: , Rfl:     topiramate (TOPAMAX) 50 MG tablet, Take 50 mg by mouth 2 (two) times daily., Disp: , Rfl:     VRAYLAR 3 mg Cap, Take 3 mg by mouth., Disp: , Rfl:

## 2025-03-11 NOTE — H&P (VIEW-ONLY)
New Patient Evaluation  [facility]    Sammi Pope  : 1970  Date: 3/11/2025     CHIEF COMPLAINT:  Low-back Pain and Hip Pain (Up to mid thigh, bilateral)    Referring Physician: Rizwan Winchester NP  Primary Care Physician: Alexus, Start    HPI:  This is a 54 y.o. female with a chief complaint of Low-back Pain and Hip Pain (Up to mid thigh, bilateral)  . The patient has Past medical history/Past surgical history of   right hip pain, tobacco use disorder, lumbar radiculopathy, hypertension, CAD s/p stents  Patient was evaluated and referred by  primary care provider for low back pain, she was started on Lyrica 75 mg daily  She has MRI lumbar spine completed outside [facility]05/2024 that showed at L4-L5 moderate disc bulge, no central canal stenosis, neural foramina narrowing are moderately narrowed bilaterally by disc bulge and facet hypertrophy, at L5-S1 there is a moderate disc bulge with desiccation of the disc with no central canal stenosis and neural foramina narrowing    Patient states she see's [facility] for her back pain currently. States she has tried multiple Lumbar Epidural Steroid Injections with little relief lasting 2-3 weeks only.     Diabetic: No    Anticoagulation medications: None    Allergy To Iodine: No    Currently on Antibiotic: No    Current Description of Pain Symptoms:    History of Recent Fall or Trauma: Yes   Onset: Chronic, started years ago  Pain Location: low back  Radiates/associated symptoms: bilateral hips to mid thigh.   Pain is Getting worse over the last few years   The pain is described as aching, stabbing, and throbbing.   Exacerbating factors: nothing in particular.   Mitigating factors nothing in particular.   Symptoms interfere with daily activity, sleeping,.   The patient feels like symptoms have been worsening.   Patient denies night fever/night sweats, urinary incontinence, bowel incontinence, significant weight loss, and loss of  sensations.    Pain score:   Current: 10/10  Best: 8/10  Worst: 10/10    Current pain medications:   Tizanidine as needed for pain:4 mg BID    Current Narcotics/Opioid /benzo Medications:  Opioids- None  Benzodiazepines: Yes    UDS:  NA    PDMP:  Reviewed and consistent with medication use as prescribed.     Previous Chronic Pain Treatment History:  Six weeks of conservative therapy include: Physical Therapy/HEP/Physician Lead Exercise Program:  Over the past 12 months, Patient has done  0 sessions. Completed in 2016  PT response: Not Helpful.   Dates of the PT sessions: 2016  Is patient actively participating in home exercise program (HEP)/ physician led exercise program in the last 6 months: yes.    Non-interventional Pain Therapy:  [x]Chiropractor.   []Acupuncture/Dry needle.  []TENS unit.  []Heat/ICE.  []Back Brace.    Medications previously tried:  NSAIDs: Ibuprofen (Advil/Motrin), Meloxicam (Mobic), Celebrex (Celecoxib), Naproxen (Naprosyn), and Diclofenac  Topical Agent: Yes Lidocaine patches  TCA/SSRI/SNRI: TCA: Amitriptyline (Elavil), SSRI: Escitalopram (Lexapro), SSRI: Fluoxetine (Prozac), SSRI: Paroxetine (Paxil, Pexeva), SSRI: Sertraline (Zoloft), and SNRI: Duloxetine (Cymbalta)   Anti-convulsants: Gabapentin , Lyrica , Topiramate , and Lamotrigine   Muscle Relaxants: Flexeril (Cyclobenzaprine), Tizanidine (Zanaflex), and Baclofen   Opioids- Oxycodone with Acetaminophen (Percocet), Hydrocodone with Acetaminophen (Norco), Codeine with Acetaminophen, and Tramadol (Ultram).    Interventional Pain Procedures:   multiple Lumbar facet joint injection at L4-L5  2023, 2024 and 2025   Cervical facet joint injection at C5-C6 and C6-C7 2022   Carpal and cubital tunnel flexion ultrasound guided 2022  Lumbar epidural steroid injection 2020 and 2021  Cervical trigger point injection in 2020  ( No previous medial branch block or ablation)  Previous spine/Relevant joint surgery:  N/a  Surgical History:   has a past  "surgical history that includes  section; Dilation and curettage of uterus; Tubal ligation; Coronary angioplasty with stent (2019); Left heart catheterization (Left, 3/13/2019); Left heart catheterization (Left, 2019); Breast biopsy (Right, ); and Hysterectomy ().  Medical History:   has a past medical history of Bulging lumbar disc, Current every day smoker, H/O heart artery stent, Heart attack, Hypercholesteremia, Hypertension, MI (myocardial infarction), Migraines, and Psoriasis.  Family History:  family history includes Breast cancer in her paternal aunt; Diabetes in her sister; Hypertension in her mother; Lung cancer in her father.  Allergies:  Codeine, Cyclobenzaprine, Diclofenac, Gabapentin, Isosorbide, Pregabalin, and Tramadol   Social History/SUBSTANCE ABUSE HISTORY:  Personal history of substance abuse: No   reports that she has been smoking cigarettes. She started smoking about 14 years ago. She has a 14.2 pack-year smoking history. She has never used smokeless tobacco. She reports that she does not drink alcohol and does not use drugs.  LABS:  CBC  Lab Results   Component Value Date    WBC 9.20 2020    HGB 11.1 (L) 2020    HCT 33.4 (L) 2020     Coagulation Profile   Lab Results   Component Value Date     2020       Lab Results   Component Value Date    INR 1.0 2019     CMP:  BMP  Lab Results   Component Value Date     2020    K 3.9 2020     2020    CO2 27 2020    BUN 15 2020    CREATININE 1.00 2020    CALCIUM 9.7 2020    ANIONGAP 11 2019     Lab Results   Component Value Date    ALT 15 2020    AST 23 2020    ALKPHOS 77 2020    BILITOT 1.0 2020     HGBA1C:  No results found for: "LABA1C", "HGBA1C"    ROS:    Review of Systems   GENERAL:  No weight loss, malaise or fevers.  HEENT:   No recent changes in vision or hearing  NECK:  Negative for lumps, no difficulty " "with swallowing.  RESPIRATORY:  Negative for cough, wheezing or shortness of breath, patient denies any recent URI.  CARDIOVASCULAR:  Negative for chest pain or palpitations.  GI:  Negative for abdominal discomfort, blood in stools or black stools or change in bowel habits.  MUSCULOSKELETAL:  See HPI.  SKIN:  Negative for lesions, rash, and itching.  PSYCH:  No mood disorder or recent psychosocial stressors.   HEMATOLOGY/LYMPHOLOGY:  See the blood thinner sectioned in HPI.  NEURO:  See HPI  All other reviewed and negative other than HPI.    PHYSICAL EXAM:  VITALS: /67   Pulse 89   Ht 5' 3" (1.6 m)   Wt 81.5 kg (179 lb 9.6 oz)   BMI 31.81 kg/m²   Body mass index is 31.81 kg/m².  GENERAL: Well appearing, in no acute distress, alert and oriented x3, answers questions appropriately.   PSYCH: Flat affect.  SKIN: Skin color, texture, turgor normal, no rashes or lesions.  HEAD/FACE:  Normocephalic, atraumatic. Cranial nerves grossly intact.  CV: Regular rate  PULM: No evidence of respiratory difficulty, symmetric chest rise.  GI:  Soft and non-Distended.    BACK/SIJ/HIP:  Lumbar Spine Exam:       Inspection: No erythema, bruising.       Palpation: (+++) TTP of lumbar paraspinals bilaterally      ROM:  Limited in flexion, extension, lateral bending.       (+++) Facet loading bilateral      (-) Straight Leg Raise, bilateral      (Negative) YULI, Tenderness over the PSIS, Yeoman test, bilateral  Hip Exam:      Inspection: No gross deformity or apparent leg length discrepancy      Palpation:  No TTP to bilateral greater trochanteric bursas.       ROM:  no limitation Due to pain in internal rotation, external rotation b/l  Neurologic Exam:     Alert. Speech is fluent and appropriate.     Strength: 4/5 in bilateral hip flexion and knee extension     Sensation:  Grossly intact to light touch in bilateral lower extremities     Tone: No abnormality appreciated in bilateral lower extremities    GAIT:  antalgic, " unsteady    DIAGNOSTIC STUDIES AND MEDICAL RECORDS REVIEW:  I have personally reviewed and interpreted relevant radiology reports and reviewed relevant records from other services in the EMR.   - MRI lumbar spine 05/2024  There is disc space narrowing at L4-L5. There is moderate disc bulging at L4-L5 and L5-S1. The bowel gas patter appears unremarkable. There is a probable left renal cyst unchanged  L1-L2: There is no central or foramen stenosis.  L2-L3: There is no central or foramen stenosis.  L3-L4: There is no central or foramen stenosis.  L4-15: There is moderate disc bulging There is no central canal stenosis. The neural foramen are moderately narrowed bilaterally by disc bulging and facet hypertrophy.  L5-S1: There is moderate disc bulging with desiccation of the disc. There is no central canal stenosis. There is no foraminal stenosis.  Clinical Impression:  This is a pleasant 54 y.o. female patient with PMH/PSH of  right hip pain, tobacco use disorder, lumbar radiculopathy, hypertension, CAD s/p stents, presenting with axial low back pain, nonradiating, patient had lumbar epidural steroid injection, lumbar facet joint injection outside [facility] without sustained pain relief, patient has completed multiple sessions of physical therapy prior with no benefit, patient declined formal physical therapy.   Sammi Pope has chronic, moderate to severe axial lower back pain present for over the past 3 months that has failed to respond to physical therapy, PT-directed home exercises, NSAIDs, and muscle relaxants. There is no untreated radiculopathy or claudication present.  The patient has clinical and radiologic findings suggestive of facet mediated pain.  We will schedule for 1st diagnostic bilateral lumbar L4/5 and L5/S1 medial branch blocks.      We discussed the underlying diagnoses and multiple treatment options including non-opioid medications, interventional procedures, physical therapy, home exercise, core  muscle enhancement, and weight loss.  The risks and benefits of each treatment option were discussed and all questions were answered.      Encounter Diagnosis:  Sammi Pope is a 54 y.o. female with the following diagnoses based on history, exam, and imagin. Lumbar facet arthropathy    2. Chronic bilateral low back pain without sciatica    3. Degeneration of intervertebral disc of lumbar region with discogenic back pain    4. Chronic pain syndrome       Treatment Plan:    Diagnostics/Referrals:   Records from [facility] would be uploaded under media section    Medications:    NSAIDs: OTC  Topical Agent: No  TCA/SSRI/SNRI: None  Anti-convulsants: None  Muscle Relaxants:  continue tizanidine as prescribed  Opioids: None    Interventional Therapy:  please schedule for bilateral lumbar medial branch block at L4-L5 and L5-S1  Sedation: Oral Sedation.  Anticoagulation medications: None -Clearance to stop Blood thinner: NA    Regarding the above interventions, the patient has been educated regarding the risks (including bleeding, infection, increased pain, nerve damage, or allergic reaction), benefits, and alternatives. The patient states she understands and is eager to proceed.    Physical Rehabilitation: Physician led exercise program and home exercise    Patient Education: Counseled patient regarding the importance of activity modification, I have stressed the importance of physical activity and a home exercise plan to help with pain and improve health.    Follow-up: RTC  after ablation.    May consider:  bilateral L4 transforaminal    I would like to thank Rizwan Winchester NP for the opportunity to assist in the care of this patient. We had a very nice visit and I look forward to continuing their care. Please let me know if I can be of further assistance.     Catarino Schneider MD  Anesthesiologist  Interventional Pain Medicine  2025    Disclaimer:  This note was prepared using voice recognition system and is likely to  have sound alike errors that may have been overlooked even after proof reading.  Please call me with any questions.

## 2025-03-14 ENCOUNTER — TELEPHONE (OUTPATIENT)
Dept: PAIN MEDICINE | Facility: CLINIC | Age: 55
End: 2025-03-14
Payer: MEDICAID

## 2025-03-14 NOTE — TELEPHONE ENCOUNTER
----- Message from Perla sent at 3/14/2025  1:26 PM CDT -----  Contact: PATIENT  Sammi BoykinkatiaMRN: [id number]: 1970PCP: [employer] Phone      [phone] Phone      Not on file.Mobile          [phone]: Patient is checking to see if Dr. Schneider is still needing the MRI disc.Phone: [phone]

## 2025-03-20 ENCOUNTER — TELEPHONE (OUTPATIENT)
Dept: OBSTETRICS AND GYNECOLOGY | Facility: CLINIC | Age: 55
End: 2025-03-20
Payer: MEDICAID

## 2025-03-20 DIAGNOSIS — Z12.31 BREAST CANCER SCREENING BY MAMMOGRAM: Primary | ICD-10-CM

## 2025-03-20 NOTE — TELEPHONE ENCOUNTER
----- Message from Med Assistant Dawn sent at 3/20/2025  1:06 PM CDT -----  Contact: self  Sammi PopeN: [id number] Phone      Work Phone      Not on file.Mobile          [phone] Care Team:Alexus, Start as PCP - General (Internal Medicine)Candido Lopez MD as Consulting Physician (Obstetrics and Gynecology)OB? NoWhat phone number can you be reached at? [phone]:  Please link mammo orders to appt 03/31/2025.

## 2025-03-21 ENCOUNTER — HOSPITAL ENCOUNTER (OUTPATIENT)
Facility: HOSPITAL | Age: 55
Discharge: HOME | End: 2025-03-21
Attending: ANESTHESIOLOGY | Admitting: ANESTHESIOLOGY
Payer: MEDICAID

## 2025-03-21 ENCOUNTER — HOSPITAL ENCOUNTER (OUTPATIENT)
Dept: RADIOLOGY | Facility: HOSPITAL | Age: 55
Discharge: HOME | End: 2025-03-21
Attending: ANESTHESIOLOGY | Admitting: ANESTHESIOLOGY
Payer: MEDICAID

## 2025-03-21 VITALS
SYSTOLIC BLOOD PRESSURE: 108 MMHG | TEMPERATURE: 98 F | RESPIRATION RATE: 16 BRPM | OXYGEN SATURATION: 99 % | DIASTOLIC BLOOD PRESSURE: 64 MMHG | HEART RATE: 79 BPM

## 2025-03-21 DIAGNOSIS — M47.816 LUMBAR FACET ARTHROPATHY: ICD-10-CM

## 2025-03-21 DIAGNOSIS — F41.9 ANXIETY: ICD-10-CM

## 2025-03-21 DIAGNOSIS — M47.816 LUMBAR SPONDYLOSIS: Primary | ICD-10-CM

## 2025-03-21 DIAGNOSIS — G89.29 CHRONIC PAIN: ICD-10-CM

## 2025-03-21 PROCEDURE — 25000003 PHARM REV CODE 250: Performed by: ANESTHESIOLOGY

## 2025-03-21 PROCEDURE — 64493 INJ PARAVERT F JNT L/S 1 LEV: CPT | Mod: 50,KX,, | Performed by: ANESTHESIOLOGY

## 2025-03-21 PROCEDURE — 64494 INJ PARAVERT F JNT L/S 2 LEV: CPT | Mod: 50 | Performed by: ANESTHESIOLOGY

## 2025-03-21 PROCEDURE — 64494 INJ PARAVERT F JNT L/S 2 LEV: CPT | Mod: 50,KX,, | Performed by: ANESTHESIOLOGY

## 2025-03-21 PROCEDURE — 76000 FLUOROSCOPY <1 HR PHYS/QHP: CPT | Mod: TC

## 2025-03-21 PROCEDURE — 64493 INJ PARAVERT F JNT L/S 1 LEV: CPT | Mod: 50 | Performed by: ANESTHESIOLOGY

## 2025-03-21 PROCEDURE — 63600175 PHARM REV CODE 636 W HCPCS: Performed by: ANESTHESIOLOGY

## 2025-03-21 RX ORDER — BUPIVACAINE HYDROCHLORIDE 5 MG/ML
INJECTION, SOLUTION EPIDURAL; INTRACAUDAL; PERINEURAL
Status: DISCONTINUED | OUTPATIENT
Start: 2025-03-21 | End: 2025-03-21 | Stop reason: HOSPADM

## 2025-03-21 RX ORDER — LIDOCAINE HYDROCHLORIDE 20 MG/ML
INJECTION, SOLUTION INFILTRATION; PERINEURAL
Status: DISCONTINUED | OUTPATIENT
Start: 2025-03-21 | End: 2025-03-21 | Stop reason: HOSPADM

## 2025-03-21 RX ORDER — ALPRAZOLAM 0.25 MG/1
0.5 TABLET, ORALLY DISINTEGRATING ORAL
Status: DISCONTINUED | OUTPATIENT
Start: 2025-03-21 | End: 2025-03-21 | Stop reason: HOSPADM

## 2025-03-21 RX ADMIN — ALPRAZOLAM 0.5 MG: 0.25 TABLET, ORALLY DISINTEGRATING ORAL at 02:03

## 2025-03-21 NOTE — OP NOTE
Diagnostic Lumbar Medial Branch Block Under Fluoroscopy    The procedure, risks, benefits, and options were discussed with the patient. There are no contraindications to the procedure. The patent expressed understanding and agreed to the procedure. Informed written consent was obtained prior to the start of the procedure and can be found in the patient's chart.    PATIENT NAME: Sammi Pope   MRN: [id number]     DATE OF PROCEDURE: 03/21/2025                                           PROCEDURE:  Diagnostic Bilateral L3, L4, and L5 Lumbar Medial Branch Block under Fluoroscopy    PRE-OP DIAGNOSIS: Lumbar facet arthropathy [M47.816] Lumbar spondylosis [M47.816]    POST-OP DIAGNOSIS: Same    PHYSICIAN: Catarino Schneider MD      MEDICATIONS INJECTED:  Bupivicaine 0.25%    LOCAL ANESTHETIC INJECTED:   Xylocaine 2%    SEDATION: oral    ESTIMATED BLOOD LOSS:  None    COMPLICATIONS:  None.    INTERVAL HISTORY: Patient has clinical and imaging findings suggestive of facet mediated pain.    TECHNIQUE: Time-out was performed to identify the patient and procedure to be performed. With the patient laying in a prone position, the surgical area was prepped and draped in the usual sterile fashion using ChloraPrep and fenestrated drape. The levels were determined under fluoroscopic guidance. Skin anesthesia was achieved by injecting Lidocaine 2% over the injection sites. A 22 gauge, 3.5 inch needle was introduced into the medial branch nerves at the junctions of the superior articular process and the transverse processes of the targeted sites using AP, lateral and/or contralateral oblique fluoroscopic imaging. After negative aspiration for blood or CSF was confirmed, 0.5 mL of the anesthetic listed above was then slowly injected at each site. The needles were removed and bleeding was nil. A sterile dressing was applied. No specimens collected. The patient tolerated the procedure well.    The patient was monitored after the procedure in  the recovery area. They were given post-procedure and discharge instructions to follow at home. The patient was discharged in a stable condition.    Catarino Schneider MD

## 2025-03-21 NOTE — DISCHARGE SUMMARY
Discharge Note  Short Stay    Admit Date: 3/21/2025    Attending Physician: Catarino Schneider    Discharge Physician: Catarino Schneider    Discharge Date: 3/21/2025 2:44 PM    Procedure(s) (LRB):  LUMBAR MEDIAL BRANCH NERVE BLOCK (L4-5,L5-S1) (1ST) (ORAL) (NO CLEARANCE NEEDED) (Bilateral)    Final Diagnosis: Lumbar facet arthropathy [M47.816]    Disposition: Home or self care    Patient Instructions:   Current Discharge Medication List        CONTINUE these medications which have NOT CHANGED    Details   buPROPion (WELLBUTRIN XL) 150 MG TB24 tablet Take 150 mg by mouth once daily.      busPIRone (BUSPAR) 15 MG tablet Take 15 mg by mouth 3 (three) times daily.      clonazePAM (KLONOPIN) 1 MG tablet Take 1 mg by mouth 2 (two) times daily.      pantoprazole (PROTONIX) 40 MG tablet Take 40 mg by mouth once daily.      QUEtiapine (SEROQUEL) 200 MG Tab Take 400 mg by mouth every evening.      tiZANidine (ZANAFLEX) 4 MG tablet Take 4 mg by mouth 2 (two) times daily.      atorvastatin (LIPITOR) 40 MG tablet Take 1 tablet (40 mg total) by mouth once daily.  Qty: 90 tablet, Refills: 3      augmented betamethasone dipropionate (DIPROLENE-AF) 0.05 % cream Apply topically daily as needed.      clopidogrel (PLAVIX) 75 mg tablet Take 1 tablet (75 mg total) by mouth once daily.  Qty: 30 tablet, Refills: 11      DUPIXENT  mg/2 mL PnIj SMARTSI Milligram(s) SUB-Q Every 2 Weeks      metoprolol tartrate (LOPRESSOR) 25 MG tablet Take 1 tablet (25 mg total) by mouth 2 (two) times daily.  Qty: 60 tablet, Refills: 11      nitroGLYCERIN (NITROSTAT) 0.4 MG SL tablet Place 0.4 mg under the tongue every 5 (five) minutes as needed for Chest pain.      rOPINIRole (REQUIP) 0.5 MG tablet Take 0.5 mg by mouth 3 (three) times daily.      topiramate (TOPAMAX) 50 MG tablet Take 50 mg by mouth 2 (two) times daily.      VRAYLAR 3 mg Cap Take 3 mg by mouth.      WEGOVY 0.25 mg/0.5 mL PnIj Inject 0.5 mLs into the skin once a week.             Discharge  Diagnosis: Lumbar facet arthropathy [M47.816]  Condition on Discharge: Stable with no complications to procedure   Diet on Discharge: Same as before.  Activity: as per instruction sheet.  Discharge to: Home with a responsible adult.  Follow up: 2-4 weeks       Please call my office or pager at [phone] if experienced any weakness or loss of sensation, fever > 101.5, pain uncontrolled with oral medications, persistent nausea/vomiting/or diarrhea, redness or drainage from the incisions, or any other worrisome concerns. If physician on call was not reached or could not communicate with our office for any reason please go to the nearest emergency department.     Catarino Schneider  03/21/2025

## 2025-03-21 NOTE — DISCHARGE INSTRUCTIONS
DIET: You may resume your normal diet today.    BATHING: You may resume your normal bathing.          You may shower, no hot water directly on site for 24 hours.    DRESSING: You may remove your bandage today.    ACTIVITY LEVEL: You may resume your normal activities 24 hours after your  procedure.    If you have received sedation or an anesthetic, you may feel sleepy  for several hours. Rest until you are more awake. Gradually resume your normal activities tomorrow.    If you have received sedation or an anesthetic, do not drive or operate heavy machinery for at least 24 hours.    MEDICATION: You may resume your normal medications today.    You will receive instructions for any pain prescriptions. Pain medications should be taken only as directed.    SPECIAL INSTRUCTIONS: No heat to the injection site for 24 hours including: bath or shower, heating pad, moist heat, hot tubs.    Use ice pack to injection site for any pain or discomfort. Apply ice pack to 20 minutes then remove for 20 minutes before re-applying to site.    WHEN TO CALL DOCTOR: Redness or swelling around injection site    Fever of 101F    Drainage (pus) from the injection site    For any continuous bleeding (some dried blood over the incision is normal).    FOLLOW UP: Follow up phone call will be made by office.        Diet on Discharge: Same as before.  Activity: as per instruction sheet.  Discharge to: Home with a responsible adult.  Follow up: 2-4 weeks        Please call my office or pager at [phone] if experienced any weakness or loss of sensation, fever > 101.5, pain uncontrolled with oral medications, persistent nausea/vomiting/or diarrhea, redness or drainage from the incisions, or any other worrisome concerns. If physician on call was not reached or could not communicate with our office for any reason please go to the nearest emergency department.      Catarino Schneider

## 2025-04-02 ENCOUNTER — HOSPITAL ENCOUNTER (OUTPATIENT)
Dept: RADIOLOGY | Facility: HOSPITAL | Age: 55
Discharge: HOME | End: 2025-04-02
Attending: OBSTETRICS & GYNECOLOGY
Payer: MEDICAID

## 2025-04-02 VITALS — HEIGHT: 63 IN | BODY MASS INDEX: 31.71 KG/M2 | WEIGHT: 179 LBS

## 2025-04-02 DIAGNOSIS — Z12.31 BREAST CANCER SCREENING BY MAMMOGRAM: ICD-10-CM

## 2025-04-02 PROCEDURE — 77063 BREAST TOMOSYNTHESIS BI: CPT | Mod: TC

## 2025-04-02 PROCEDURE — 77067 SCR MAMMO BI INCL CAD: CPT | Mod: 26,,, | Performed by: RADIOLOGY

## 2025-04-02 PROCEDURE — 77063 BREAST TOMOSYNTHESIS BI: CPT | Mod: 26,,, | Performed by: RADIOLOGY

## 2025-04-03 ENCOUNTER — RESULTS FOLLOW-UP (OUTPATIENT)
Dept: OBSTETRICS AND GYNECOLOGY | Facility: CLINIC | Age: 55
End: 2025-04-03

## 2025-04-08 ENCOUNTER — OFFICE VISIT (OUTPATIENT)
Dept: PAIN MEDICINE | Facility: CLINIC | Age: 55
End: 2025-04-08
Payer: MEDICAID

## 2025-04-08 VITALS
BODY MASS INDEX: 30.3 KG/M2 | SYSTOLIC BLOOD PRESSURE: 125 MMHG | HEART RATE: 88 BPM | WEIGHT: 171 LBS | DIASTOLIC BLOOD PRESSURE: 78 MMHG | HEIGHT: 63 IN | OXYGEN SATURATION: 92 %

## 2025-04-08 DIAGNOSIS — G89.29 CHRONIC BILATERAL LOW BACK PAIN WITHOUT SCIATICA: ICD-10-CM

## 2025-04-08 DIAGNOSIS — G89.4 CHRONIC PAIN SYNDROME: ICD-10-CM

## 2025-04-08 DIAGNOSIS — M47.816 LUMBAR FACET ARTHROPATHY: Primary | ICD-10-CM

## 2025-04-08 DIAGNOSIS — M51.360 DEGENERATION OF INTERVERTEBRAL DISC OF LUMBAR REGION WITH DISCOGENIC BACK PAIN: ICD-10-CM

## 2025-04-08 DIAGNOSIS — M54.50 CHRONIC BILATERAL LOW BACK PAIN WITHOUT SCIATICA: ICD-10-CM

## 2025-04-08 PROCEDURE — 99213 OFFICE O/P EST LOW 20 MIN: CPT | Mod: PBBFAC | Performed by: ANESTHESIOLOGY

## 2025-04-08 PROCEDURE — 3008F BODY MASS INDEX DOCD: CPT | Mod: CPTII,,, | Performed by: ANESTHESIOLOGY

## 2025-04-08 PROCEDURE — 99999 PR PBB SHADOW E&M-EST. PATIENT-LVL III: CPT | Mod: PBBFAC,,, | Performed by: ANESTHESIOLOGY

## 2025-04-08 PROCEDURE — 1159F MED LIST DOCD IN RCRD: CPT | Mod: CPTII,,, | Performed by: ANESTHESIOLOGY

## 2025-04-08 PROCEDURE — 3078F DIAST BP <80 MM HG: CPT | Mod: CPTII,,, | Performed by: ANESTHESIOLOGY

## 2025-04-08 PROCEDURE — 3074F SYST BP LT 130 MM HG: CPT | Mod: CPTII,,, | Performed by: ANESTHESIOLOGY

## 2025-04-08 PROCEDURE — 1160F RVW MEDS BY RX/DR IN RCRD: CPT | Mod: CPTII,,, | Performed by: ANESTHESIOLOGY

## 2025-04-08 PROCEDURE — 99213 OFFICE O/P EST LOW 20 MIN: CPT | Mod: S$PBB,,, | Performed by: ANESTHESIOLOGY

## 2025-04-08 RX ORDER — DEXTROMETHORPHAN HYDROBROMIDE, BUPROPION HYDROCHLORIDE 105; 45 MG/1; MG/1
TABLET, MULTILAYER, EXTENDED RELEASE ORAL DAILY
COMMUNITY
Start: 2025-04-04

## 2025-04-08 NOTE — PROGRESS NOTES
EST Patient Evaluation  [facility]    Sammi Pope  : 1970  Date: 2025     CHIEF COMPLAINT:  No chief complaint on file.    Referring Physician: No ref. provider found  Primary Care Physician: Alexus, Start    HPI:  This is a 54 y.o. female with a chief complaint of No chief complaint on file.  . The patient has Past medical history/Past surgical history of   right hip pain, tobacco use disorder, lumbar radiculopathy, hypertension, CAD s/p stents  Patient was evaluated and referred by  primary care provider for low back pain, she was started on Lyrica 75 mg daily  She has MRI lumbar spine completed outside [facility]05/2024 that showed at L4-L5 moderate disc bulge, no central canal stenosis, neural foramina narrowing are moderately narrowed bilaterally by disc bulge and facet hypertrophy, at L5-S1 there is a moderate disc bulge with desiccation of the disc with no central canal stenosis and neural foramina narrowing    Patient states she see's [facility] for her back pain currently. States she has tried multiple Lumbar Epidural Steroid Injections with little relief lasting 2-3 weeks only.       Interval History 2025:  Sammi Pope is here for *** follow up visit after***    Sammi Pope is here for procedure follow up visit after***,  patient reported *** % improvement in pain and functionality.  Current pain score: ***/10    Diabetic: No    Anticoagulation medications: None    Allergy To Iodine: No    Currently on Antibiotic: No    Current Description of Pain Symptoms:    History of Recent Fall or Trauma: Yes   Onset: Chronic, started years ago  Pain Location: low back  Radiates/associated symptoms: bilateral hips to mid thigh.   Pain is Getting worse over the last few years   The pain is described as aching, stabbing, and throbbing.   Exacerbating factors: nothing in particular.   Mitigating factors nothing in particular.   Symptoms interfere with daily activity,  sleeping,.   The patient feels like symptoms have been worsening.   Patient denies night fever/night sweats, urinary incontinence, bowel incontinence, significant weight loss, and loss of sensations.    Pain score:   Current: 10/10  Best: 8/10  Worst: 10/10    Current pain medications:   Tizanidine as needed for pain:4 mg BID    Current Narcotics/Opioid /benzo Medications:  Opioids- None  Benzodiazepines: Yes    UDS:  NA    PDMP:  Reviewed and consistent with medication use as prescribed.     Previous Chronic Pain Treatment History:  Six weeks of conservative therapy include: Physical Therapy/HEP/Physician Lead Exercise Program:  Over the past 12 months, Patient has done  0 sessions. Completed in 2016  PT response: Not Helpful.   Dates of the PT sessions: 2016  Is patient actively participating in home exercise program (HEP)/ physician led exercise program in the last 6 months: yes.    Non-interventional Pain Therapy:  [x]Chiropractor.   []Acupuncture/Dry needle.  []TENS unit.  []Heat/ICE.  []Back Brace.    Medications previously tried:  NSAIDs: Ibuprofen (Advil/Motrin), Meloxicam (Mobic), Celebrex (Celecoxib), Naproxen (Naprosyn), and Diclofenac  Topical Agent: Yes Lidocaine patches  TCA/SSRI/SNRI: TCA: Amitriptyline (Elavil), SSRI: Escitalopram (Lexapro), SSRI: Fluoxetine (Prozac), SSRI: Paroxetine (Paxil, Pexeva), SSRI: Sertraline (Zoloft), and SNRI: Duloxetine (Cymbalta)   Anti-convulsants: Gabapentin , Lyrica , Topiramate , and Lamotrigine   Muscle Relaxants: Flexeril (Cyclobenzaprine), Tizanidine (Zanaflex), and Baclofen   Opioids- Oxycodone with Acetaminophen (Percocet), Hydrocodone with Acetaminophen (Norco), Codeine with Acetaminophen, and Tramadol (Ultram).    Interventional Pain Procedures:   multiple Lumbar facet joint injection at L4-L5  2023, 2024 and 2025   Cervical facet joint injection at C5-C6 and C6-C7 2022   Carpal and cubital tunnel flexion ultrasound guided 2022  Lumbar epidural steroid  injection  and   Cervical trigger point injection in   ( No previous medial branch block or ablation)  Previous spine/Relevant joint surgery:  N/a  Surgical History:   has a past surgical history that includes  section; Dilation and curettage of uterus; Tubal ligation; Coronary angioplasty with stent (2019); Left heart catheterization (Left, 3/13/2019); Left heart catheterization (Left, 2019); Breast biopsy (Right, ); Hysterectomy (); and Injection of anesthetic agent around medial branch nerves innervating lumbar facet joint (Bilateral, 3/21/2025).  Medical History:   has a past medical history of Bulging lumbar disc, Current every day smoker, H/O heart artery stent, Heart attack, Hypercholesteremia, Hypertension, MI (myocardial infarction), Migraines, and Psoriasis.  Family History:  family history includes Breast cancer in her paternal aunt; Diabetes in her sister; Hypertension in her mother; Lung cancer in her father.  Allergies:  Codeine, Cyclobenzaprine, Diclofenac, Gabapentin, Isosorbide, Pregabalin, and Tramadol   Social History/SUBSTANCE ABUSE HISTORY:  Personal history of substance abuse: No   reports that she has been smoking cigarettes. She started smoking about 14 years ago. She has a 14.3 pack-year smoking history. She has never used smokeless tobacco. She reports that she does not drink alcohol and does not use drugs.  LABS:  CBC  Lab Results   Component Value Date    WBC 9.20 2020    HGB 11.1 (L) 2020    HCT 33.4 (L) 2020     Coagulation Profile   Lab Results   Component Value Date     2020       Lab Results   Component Value Date    INR 1.0 2019     CMP:  BMP  Lab Results   Component Value Date     2020    K 3.9 2020     2020    CO2 27 2020    BUN 15 2020    CREATININE 1.00 2020    CALCIUM 9.7 2020    ANIONGAP 11 2019     Lab Results   Component Value Date    ALT 15  "01/01/2020    AST 23 01/01/2020    ALKPHOS 77 01/01/2020    BILITOT 1.0 01/01/2020     HGBA1C:  No results found for: "LABA1C", "HGBA1C"    ROS:    Review of Systems   GENERAL:  No weight loss, malaise or fevers.  HEENT:   No recent changes in vision or hearing  NECK:  Negative for lumps, no difficulty with swallowing.  RESPIRATORY:  Negative for cough, wheezing or shortness of breath, patient denies any recent URI.  CARDIOVASCULAR:  Negative for chest pain or palpitations.  GI:  Negative for abdominal discomfort, blood in stools or black stools or change in bowel habits.  MUSCULOSKELETAL:  See HPI.  SKIN:  Negative for lesions, rash, and itching.  PSYCH:  No mood disorder or recent psychosocial stressors.   HEMATOLOGY/LYMPHOLOGY:  See the blood thinner sectioned in HPI.  NEURO:  See HPI  All other reviewed and negative other than HPI.    PHYSICAL EXAM:  VITALS: There were no vitals taken for this visit.  There is no height or weight on file to calculate BMI.  GENERAL: Well appearing, in no acute distress, alert and oriented x3, answers questions appropriately.   PSYCH: Flat affect.  SKIN: Skin color, texture, turgor normal, no rashes or lesions.  HEAD/FACE:  Normocephalic, atraumatic. Cranial nerves grossly intact.  CV: Regular rate  PULM: No evidence of respiratory difficulty, symmetric chest rise.  GI:  Soft and non-Distended.    BACK/SIJ/HIP:  Lumbar Spine Exam:       Inspection: No erythema, bruising.       Palpation: (+++) TTP of lumbar paraspinals bilaterally      ROM:  Limited in flexion, extension, lateral bending.       (+++) Facet loading bilateral      (-) Straight Leg Raise, bilateral      (Negative) YULI, Tenderness over the PSIS, Yeoman test, bilateral  Hip Exam:      Inspection: No gross deformity or apparent leg length discrepancy      Palpation:  No TTP to bilateral greater trochanteric bursas.       ROM:  no limitation Due to pain in internal rotation, external rotation b/l  Neurologic Exam:     " Alert. Speech is fluent and appropriate.     Strength: 4/5 in bilateral hip flexion and knee extension     Sensation:  Grossly intact to light touch in bilateral lower extremities     Tone: No abnormality appreciated in bilateral lower extremities    GAIT:  antalgic, unsteady    DIAGNOSTIC STUDIES AND MEDICAL RECORDS REVIEW:  I have personally reviewed and interpreted relevant radiology reports and reviewed relevant records from other services in the EMR.   - MRI lumbar spine 05/2024  There is disc space narrowing at L4-L5. There is moderate disc bulging at L4-L5 and L5-S1. The bowel gas patter appears unremarkable. There is a probable left renal cyst unchanged  L1-L2: There is no central or foramen stenosis.  L2-L3: There is no central or foramen stenosis.  L3-L4: There is no central or foramen stenosis.  L4-15: There is moderate disc bulging There is no central canal stenosis. The neural foramen are moderately narrowed bilaterally by disc bulging and facet hypertrophy.  L5-S1: There is moderate disc bulging with desiccation of the disc. There is no central canal stenosis. There is no foraminal stenosis.  Clinical Impression:  This is a pleasant 54 y.o. female patient with PMH/PSH of  right hip pain, tobacco use disorder, lumbar radiculopathy, hypertension, CAD s/p stents, presenting with axial low back pain, nonradiating, patient had lumbar epidural steroid injection, lumbar facet joint injection outside [facility] without sustained pain relief, patient has completed multiple sessions of physical therapy prior with no benefit, patient declined formal physical therapy.   Sammi Pope has chronic, moderate to severe axial lower back pain present for over the past 3 months that has failed to respond to physical therapy, PT-directed home exercises, NSAIDs, and muscle relaxants. There is no untreated radiculopathy or claudication present.  The patient has clinical and radiologic findings suggestive of facet mediated  pain.  We will schedule for 1st diagnostic bilateral lumbar L4/5 and L5/S1 medial branch blocks.      We discussed the underlying diagnoses and multiple treatment options including non-opioid medications, interventional procedures, physical therapy, home exercise, core muscle enhancement, and weight loss.  The risks and benefits of each treatment option were discussed and all questions were answered.      Encounter Diagnosis:  Sammi Pope is a 54 y.o. female with the following diagnoses based on history, exam, and imaging:  There are no diagnoses linked to this encounter.       Treatment Plan:    Diagnostics/Referrals:   [facility] would be uploaded under media section    Medications:    NSAIDs: OTC  Topical Agent: No  TCA/SSRI/SNRI: None  Anti-convulsants: None  Muscle Relaxants:  continue tizanidine as prescribed  Opioids: None    Interventional Therapy:  please schedule for bilateral lumbar medial branch block at L4-L5 and L5-S1  Sedation: Oral Sedation.  Anticoagulation medications: None -Clearance to stop Blood thinner: NA    Regarding the above interventions, the patient has been educated regarding the risks (including bleeding, infection, increased pain, nerve damage, or allergic reaction), benefits, and alternatives. The patient states she understands and is eager to proceed.    Physical Rehabilitation: Physician led exercise program and home exercise    Patient Education: Counseled patient regarding the importance of activity modification, I have stressed the importance of physical activity and a home exercise plan to help with pain and improve health.    Follow-up: RTC  after ablation.    May consider:  bilateral L4 transforaminal      Catarino Schneider MD  Anesthesiologist  Interventional Pain Medicine  04/08/2025    Disclaimer:  This note was prepared using voice recognition system and is likely to have sound alike errors that may have been overlooked even after proof reading.  Please call me with  any questions.

## 2025-04-08 NOTE — PROGRESS NOTES
EST Patient Evaluation  [facility]    Sammi Pope  : 1970  Date: 2025     CHIEF COMPLAINT:  Follow-up and Low-back Pain    Referring Physician: No ref. provider found  Primary Care Physician: Alexus, Start    HPI:  This is a 54 y.o. female with a chief complaint of Follow-up and Low-back Pain  . The patient has Past medical history/Past surgical history of   right hip pain, tobacco use disorder, lumbar radiculopathy, hypertension, CAD s/p stents  Patient was evaluated and referred by  primary care provider for low back pain, she was started on Lyrica 75 mg daily  She has MRI lumbar spine completed outside [facility]05/2024 that showed at L4-L5 moderate disc bulge, no central canal stenosis, neural foramina narrowing are moderately narrowed bilaterally by disc bulge and facet hypertrophy, at L5-S1 there is a moderate disc bulge with desiccation of the disc with no central canal stenosis and neural foramina narrowing    Patient states she see's [facility] [place] pain currently. States she has tried multiple Lumbar Epidural Steroid Injections with little relief lasting 2-3 weeks only.       Interval History 2025:  Sammi Pope is here for procedure follow up visit after Diagnostic Bilateral L3, L4, and L5 Lumbar Medial Branch Block ,  patient reported 50 % improvement in pain and functionality  She feels her back pain is tolerable  Current pain score: 3/10    Diabetic: No    Anticoagulation medications: None    Allergy To Iodine: No    Currently on Antibiotic: No    Current Description of Pain Symptoms:    History of Recent Fall or Trauma: Yes   Onset: Chronic, started years ago  Pain Location: low back  Radiates/associated symptoms: bilateral hips to mid thigh.   The pain is described as aching, stabbing, and throbbing.   Exacerbating factors: nothing in particular.   Mitigating factors nothing in particular.   Symptoms interfere with daily activity, sleeping,.   The  patient feels like symptoms have been  improving  Patient denies night fever/night sweats, urinary incontinence, bowel incontinence, significant weight loss, and loss of sensations.    Pain score:   Best: 8/10  Worst: 10/10    Current pain medications:   Tizanidine as needed for pain:4 mg BID    Current Narcotics/Opioid /benzo Medications:  Opioids- None  Benzodiazepines: Yes    UDS:  NA    PDMP:  Reviewed and consistent with medication use as prescribed.     Previous Chronic Pain Treatment History:  Six weeks of conservative therapy include: Physical Therapy/HEP/Physician Lead Exercise Program:  Over the past 12 months, Patient has done  0 sessions. Completed in 2016  PT response: Not Helpful.   Dates of the PT sessions: 2016  Is patient actively participating in home exercise program (HEP)/ physician led exercise program in the last 6 months: yes.    Non-interventional Pain Therapy:  [x]Chiropractor.   []Acupuncture/Dry needle.  []TENS unit.  []Heat/ICE.  []Back Brace.    Medications previously tried:  NSAIDs: Ibuprofen (Advil/Motrin), Meloxicam (Mobic), Celebrex (Celecoxib), Naproxen (Naprosyn), and Diclofenac  Topical Agent: Yes Lidocaine patches  TCA/SSRI/SNRI: TCA: Amitriptyline (Elavil), SSRI: Escitalopram (Lexapro), SSRI: Fluoxetine (Prozac), SSRI: Paroxetine (Paxil, Pexeva), SSRI: Sertraline (Zoloft), and SNRI: Duloxetine (Cymbalta)   Anti-convulsants: Gabapentin , Lyrica , Topiramate , and Lamotrigine   Muscle Relaxants: Flexeril (Cyclobenzaprine), Tizanidine (Zanaflex), and Baclofen   Opioids- Oxycodone with Acetaminophen (Percocet), Hydrocodone with Acetaminophen (Norco), Codeine with Acetaminophen, and Tramadol (Ultram).    Interventional Pain Procedures:  3/21/2025:  Diagnostic Bilateral L3, L4, and L5 Lumbar Medial Branch Block-50% relief  ---------------------------------------------------------------------------------------------------------------------  multiple Lumbar facet joint injection at  L4-L5  ,  and    Cervical facet joint injection at C5-C6 and C6-C7    Carpal and cubital tunnel flexion ultrasound guided   Lumbar epidural steroid injection  and   Cervical trigger point injection in   ( No previous medial branch block or ablation)  Previous spine/Relevant joint surgery:  N/a  Surgical History:   has a past surgical history that includes  section; Dilation and curettage of uterus; Tubal ligation; Coronary angioplasty with stent (2019); Left heart catheterization (Left, 3/13/2019); Left heart catheterization (Left, 2019); Breast biopsy (Right, ); Hysterectomy (); and Injection of anesthetic agent around medial branch nerves innervating lumbar facet joint (Bilateral, 3/21/2025).  Medical History:   has a past medical history of Bulging lumbar disc, Current every day smoker, H/O heart artery stent, Heart attack, Hypercholesteremia, Hypertension, MI (myocardial infarction), Migraines, and Psoriasis.  Family History:  family history includes Breast cancer in her paternal aunt; Diabetes in her sister; Hypertension in her mother; Lung cancer in her father.  Allergies:  Codeine, Cyclobenzaprine, Diclofenac, Gabapentin, Isosorbide, Pregabalin, and Tramadol   Social History/SUBSTANCE ABUSE HISTORY:  Personal history of substance abuse: No   reports that she has been smoking cigarettes. She started smoking about 14 years ago. She has a 14.3 pack-year smoking history. She has never used smokeless tobacco. She reports that she does not drink alcohol and does not use drugs.  LABS:  CBC  Lab Results   Component Value Date    WBC 9.20 2020    HGB 11.1 (L) 2020    HCT 33.4 (L) 2020     Coagulation Profile   Lab Results   Component Value Date     2020       Lab Results   Component Value Date    INR 1.0 2019     CMP:  BMP  Lab Results   Component Value Date     2020    K 3.9 2020     2020     "CO2 27 01/01/2020    BUN 15 01/01/2020    CREATININE 1.00 01/01/2020    CALCIUM 9.7 01/01/2020    ANIONGAP 11 03/13/2019     Lab Results   Component Value Date    ALT 15 01/01/2020    AST 23 01/01/2020    ALKPHOS 77 01/01/2020    BILITOT 1.0 01/01/2020     HGBA1C:  No results found for: "LABA1C", "HGBA1C"    ROS:    Review of Systems   GENERAL:  No weight loss, malaise or fevers.  HEENT:   No recent changes in vision or hearing  NECK:  Negative for lumps, no difficulty with swallowing.  RESPIRATORY:  Negative for cough, wheezing or shortness of breath, patient denies any recent URI.  CARDIOVASCULAR:  Negative for chest pain or palpitations.  GI:  Negative for abdominal discomfort, blood in stools or black stools or change in bowel habits.  MUSCULOSKELETAL:  See HPI.  SKIN:  Negative for lesions, rash, and itching.  PSYCH:  No mood disorder or recent psychosocial stressors.   HEMATOLOGY/LYMPHOLOGY:  See the blood thinner sectioned in HPI.  NEURO:  See HPI  All other reviewed and negative other than HPI.    PHYSICAL EXAM:  VITALS: /78 (BP Location: Left arm, Patient Position: Sitting)   Pulse 88   Ht 5' 3" (1.6 m)   Wt 77.6 kg (171 lb)   SpO2 (!) 92%   BMI 30.29 kg/m²   Body mass index is 30.29 kg/m².  GENERAL: Well appearing, in no acute distress, alert and oriented x3, answers questions appropriately.   PSYCH: Flat affect.  SKIN: Skin color, texture, turgor normal, no rashes or lesions.  HEAD/FACE:  Normocephalic, atraumatic. Cranial nerves grossly intact.  CV: Regular rate  PULM: No evidence of respiratory difficulty, symmetric chest rise.  GI:  Soft and non-Distended.    BACK/SIJ/HIP:  Lumbar Spine Exam:       Inspection: No erythema, bruising.       Palpation: (+++) TTP of lumbar paraspinals bilaterally      ROM:  Limited in flexion, extension, lateral bending.       (+++) Facet loading bilateral      (-) Straight Leg Raise, bilateral      (Negative) YULI, Tenderness over the PSIS, Yeoman test, " bilateral  Hip Exam:      Inspection: No gross deformity or apparent leg length discrepancy      Palpation:  No TTP to bilateral greater trochanteric bursas.       ROM:  no limitation Due to pain in internal rotation, external rotation b/l  Neurologic Exam:     Alert. Speech is fluent and appropriate.     Strength: 4/5 in bilateral hip flexion and knee extension     Sensation:  Grossly intact to light touch in bilateral lower extremities     Tone: No abnormality appreciated in bilateral lower extremities    GAIT:  antalgic, unsteady    DIAGNOSTIC STUDIES AND MEDICAL RECORDS REVIEW:  I have personally reviewed and interpreted relevant radiology reports and reviewed relevant records from other services in the EMR.   - MRI lumbar spine 05/2024  There is disc space narrowing at L4-L5. There is moderate disc bulging at L4-L5 and L5-S1. The bowel gas patter appears unremarkable. There is a probable left renal cyst unchanged  L1-L2: There is no central or foramen stenosis.  L2-L3: There is no central or foramen stenosis.  L3-L4: There is no central or foramen stenosis.  L4-15: There is moderate disc bulging There is no central canal stenosis. The neural foramen are moderately narrowed bilaterally by disc bulging and facet hypertrophy.  L5-S1: There is moderate disc bulging with desiccation of the disc. There is no central canal stenosis. There is no foraminal stenosis.  Clinical Impression:  This is a pleasant 54 y.o. female patient with PMH/PSH of  right hip pain, tobacco use disorder, lumbar radiculopathy, hypertension, CAD s/p stents, presenting with axial low back pain, nonradiating, patient had lumbar epidural steroid injection, lumbar facet joint injection outside [facility] without sustained pain relief, patient has completed multiple sessions of physical therapy prior with no benefit, patient declined formal physical therapy. She had Bl lmumbar MBB at L4-5 and L5-S1  which provided her with good relief, she was very  happy and satisfied with the current pain management plan    Encounter Diagnosis:  Sammi Pope is a 54 y.o. female with the following diagnoses based on history, exam, and imagin. Lumbar facet arthropathy    2. Chronic bilateral low back pain without sciatica    3. Degeneration of intervertebral disc of lumbar region with discogenic back pain    4. Chronic pain syndrome      Treatment Plan:    Diagnostics/Referrals:   continue with a home exercise    Medications:    NSAIDs: OTC  Topical Agent: No  TCA/SSRI/SNRI: None  Anti-convulsants: None  Muscle Relaxants:  continue tizanidine as prescribed  Opioids: None    Interventional Therapy:  may consider repeat bilateral lumbar medial branch block at L4-L5 and L5-S1  Sedation: Oral Sedation.  Anticoagulation medications: None -Clearance to stop Blood thinner: NA    Regarding the above interventions, the patient has been educated regarding the risks (including bleeding, infection, increased pain, nerve damage, or allergic reaction), benefits, and alternatives. The patient states she understands and is eager to proceed.    Physical Rehabilitation: Physician led exercise program and home exercise    Patient Education: Counseled patient regarding the importance of activity modification, I have stressed the importance of physical activity and a home exercise plan to help with pain and improve health.    Follow-up: RTC  as needed      May consider:   repeating lumbar medial branch block      Catarino Schneider MD  Anesthesiologist  Interventional Pain Medicine  2025    Disclaimer:  This note was prepared using voice recognition system and is likely to have sound alike errors that may have been overlooked even after proof reading.  Please call me with any questions.

## 2025-08-22 ENCOUNTER — TELEPHONE (OUTPATIENT)
Dept: PAIN MEDICINE | Facility: CLINIC | Age: 55
End: 2025-08-22
Payer: MEDICAID

## 2025-08-26 ENCOUNTER — OFFICE VISIT (OUTPATIENT)
Dept: PAIN MEDICINE | Facility: CLINIC | Age: 55
End: 2025-08-26
Payer: MEDICAID

## 2025-08-26 ENCOUNTER — TELEPHONE (OUTPATIENT)
Dept: PAIN MEDICINE | Facility: CLINIC | Age: 55
End: 2025-08-26

## 2025-08-26 VITALS
SYSTOLIC BLOOD PRESSURE: 110 MMHG | OXYGEN SATURATION: 96 % | BODY MASS INDEX: 28.17 KG/M2 | DIASTOLIC BLOOD PRESSURE: 78 MMHG | HEIGHT: 63 IN | WEIGHT: 159 LBS | HEART RATE: 76 BPM

## 2025-08-26 DIAGNOSIS — M54.50 CHRONIC BILATERAL LOW BACK PAIN WITHOUT SCIATICA: ICD-10-CM

## 2025-08-26 DIAGNOSIS — M47.816 LUMBAR FACET ARTHROPATHY: Primary | ICD-10-CM

## 2025-08-26 DIAGNOSIS — M47.816 LUMBAR SPONDYLOSIS: Primary | ICD-10-CM

## 2025-08-26 DIAGNOSIS — G89.29 CHRONIC BILATERAL LOW BACK PAIN WITHOUT SCIATICA: ICD-10-CM

## 2025-08-26 DIAGNOSIS — M54.51 VERTEBROGENIC LOW BACK PAIN: ICD-10-CM

## 2025-08-26 DIAGNOSIS — M51.360 DEGENERATION OF INTERVERTEBRAL DISC OF LUMBAR REGION WITH DISCOGENIC BACK PAIN: ICD-10-CM

## 2025-08-26 PROCEDURE — 1159F MED LIST DOCD IN RCRD: CPT | Mod: CPTII,,, | Performed by: ANESTHESIOLOGY

## 2025-08-26 PROCEDURE — 3078F DIAST BP <80 MM HG: CPT | Mod: CPTII,,, | Performed by: ANESTHESIOLOGY

## 2025-08-26 PROCEDURE — 99214 OFFICE O/P EST MOD 30 MIN: CPT | Mod: S$PBB,,, | Performed by: ANESTHESIOLOGY

## 2025-08-26 PROCEDURE — 3008F BODY MASS INDEX DOCD: CPT | Mod: CPTII,,, | Performed by: ANESTHESIOLOGY

## 2025-08-26 PROCEDURE — 99999 PR PBB SHADOW E&M-EST. PATIENT-LVL IV: CPT | Mod: PBBFAC,,, | Performed by: ANESTHESIOLOGY

## 2025-08-26 PROCEDURE — 3074F SYST BP LT 130 MM HG: CPT | Mod: CPTII,,, | Performed by: ANESTHESIOLOGY

## 2025-08-26 PROCEDURE — 99214 OFFICE O/P EST MOD 30 MIN: CPT | Mod: PBBFAC | Performed by: ANESTHESIOLOGY

## 2025-08-26 PROCEDURE — 1160F RVW MEDS BY RX/DR IN RCRD: CPT | Mod: CPTII,,, | Performed by: ANESTHESIOLOGY

## 2025-08-26 RX ORDER — METHOCARBAMOL 500 MG/1
1000 TABLET, FILM COATED ORAL 3 TIMES DAILY PRN
Qty: 180 TABLET | Refills: 0 | Status: SHIPPED | OUTPATIENT
Start: 2025-08-26

## 2025-09-04 ENCOUNTER — TELEPHONE (OUTPATIENT)
Dept: PREADMISSION TESTING | Facility: HOSPITAL | Age: 55
End: 2025-09-04
Payer: MEDICAID

## (undated) DEVICE — MARKER SKIN RULER AND LABEL

## (undated) DEVICE — KIT NERVE BLOCK PREP BAPTIST

## (undated) DEVICE — CHLORAPREP 10.5 ML APPLICATOR